# Patient Record
Sex: MALE | Race: WHITE | Employment: OTHER | URBAN - METROPOLITAN AREA
[De-identification: names, ages, dates, MRNs, and addresses within clinical notes are randomized per-mention and may not be internally consistent; named-entity substitution may affect disease eponyms.]

---

## 2017-04-11 RX ORDER — PRAVASTATIN SODIUM 40 MG
TABLET ORAL
Qty: 90 TABLET | Refills: 1 | Status: SHIPPED | OUTPATIENT
Start: 2017-04-11 | End: 2017-10-08 | Stop reason: SDUPTHER

## 2017-05-09 ENCOUNTER — TELEPHONE (OUTPATIENT)
Dept: INTERNAL MEDICINE CLINIC | Age: 72
End: 2017-05-09

## 2017-05-09 DIAGNOSIS — E11.9 TYPE 2 DIABETES MELLITUS WITHOUT COMPLICATION, WITHOUT LONG-TERM CURRENT USE OF INSULIN (HCC): Primary | ICD-10-CM

## 2017-05-09 LAB
A/G RATIO: 1.8 (ref 1.1–2.2)
ALBUMIN SERPL-MCNC: 4.2 G/DL (ref 3.4–5)
ALP BLD-CCNC: 54 U/L (ref 40–129)
ALT SERPL-CCNC: 18 U/L (ref 10–40)
ANION GAP SERPL CALCULATED.3IONS-SCNC: 11 MMOL/L (ref 3–16)
AST SERPL-CCNC: 18 U/L (ref 15–37)
BILIRUB SERPL-MCNC: 0.5 MG/DL (ref 0–1)
BUN BLDV-MCNC: 10 MG/DL (ref 7–20)
CALCIUM SERPL-MCNC: 9.4 MG/DL (ref 8.3–10.6)
CHLORIDE BLD-SCNC: 99 MMOL/L (ref 99–110)
CHOLESTEROL, TOTAL: 115 MG/DL (ref 0–199)
CO2: 31 MMOL/L (ref 21–32)
CREAT SERPL-MCNC: 0.6 MG/DL (ref 0.8–1.3)
GFR AFRICAN AMERICAN: >60
GFR NON-AFRICAN AMERICAN: >60
GLOBULIN: 2.4 G/DL
GLUCOSE BLD-MCNC: 176 MG/DL (ref 70–99)
HDLC SERPL-MCNC: 62 MG/DL (ref 40–60)
LDL CHOLESTEROL CALCULATED: 39 MG/DL
POTASSIUM SERPL-SCNC: 4.8 MMOL/L (ref 3.5–5.1)
SODIUM BLD-SCNC: 141 MMOL/L (ref 136–145)
TOTAL PROTEIN: 6.6 G/DL (ref 6.4–8.2)
TRIGL SERPL-MCNC: 71 MG/DL (ref 0–150)
VLDLC SERPL CALC-MCNC: 14 MG/DL

## 2017-05-10 LAB
ESTIMATED AVERAGE GLUCOSE: 148.5 MG/DL
HBA1C MFR BLD: 6.8 %

## 2017-05-12 ENCOUNTER — OFFICE VISIT (OUTPATIENT)
Dept: INTERNAL MEDICINE CLINIC | Age: 72
End: 2017-05-12

## 2017-05-12 VITALS
BODY MASS INDEX: 32.15 KG/M2 | WEIGHT: 237.4 LBS | RESPIRATION RATE: 16 BRPM | HEART RATE: 80 BPM | DIASTOLIC BLOOD PRESSURE: 80 MMHG | SYSTOLIC BLOOD PRESSURE: 136 MMHG | HEIGHT: 72 IN

## 2017-05-12 DIAGNOSIS — E11.9 TYPE 2 DIABETES MELLITUS WITHOUT COMPLICATION, WITHOUT LONG-TERM CURRENT USE OF INSULIN (HCC): Primary | ICD-10-CM

## 2017-05-12 DIAGNOSIS — E78.00 PURE HYPERCHOLESTEROLEMIA: ICD-10-CM

## 2017-05-12 DIAGNOSIS — I10 ESSENTIAL HYPERTENSION: ICD-10-CM

## 2017-05-12 PROCEDURE — 99214 OFFICE O/P EST MOD 30 MIN: CPT | Performed by: INTERNAL MEDICINE

## 2017-05-12 PROCEDURE — 3288F FALL RISK ASSESSMENT DOCD: CPT | Performed by: INTERNAL MEDICINE

## 2017-07-03 RX ORDER — OMEPRAZOLE 40 MG/1
CAPSULE, DELAYED RELEASE ORAL
Qty: 90 CAPSULE | Refills: 2 | Status: SHIPPED | OUTPATIENT
Start: 2017-07-03 | End: 2018-03-28 | Stop reason: SDUPTHER

## 2017-08-15 ENCOUNTER — HOSPITAL ENCOUNTER (OUTPATIENT)
Dept: ENDOSCOPY | Age: 72
Discharge: HOME | End: 2017-08-15
Attending: INTERNAL MEDICINE | Admitting: INTERNAL MEDICINE

## 2017-08-15 VITALS
HEART RATE: 68 BPM | BODY MASS INDEX: 30.7 KG/M2 | OXYGEN SATURATION: 98 % | RESPIRATION RATE: 16 BRPM | SYSTOLIC BLOOD PRESSURE: 140 MMHG | DIASTOLIC BLOOD PRESSURE: 72 MMHG | TEMPERATURE: 97.4 F | WEIGHT: 226.38 LBS

## 2017-08-15 LAB
GLUCOSE BLD-MCNC: 175 MG/DL (ref 70–99)
PERFORMED ON: ABNORMAL

## 2017-08-15 RX ORDER — SODIUM CHLORIDE 9 MG/ML
INJECTION, SOLUTION INTRAVENOUS CONTINUOUS
Status: DISCONTINUED | OUTPATIENT
Start: 2017-08-15 | End: 2017-08-16 | Stop reason: HOSPADM

## 2017-08-15 RX ADMIN — SODIUM CHLORIDE: 9 INJECTION, SOLUTION INTRAVENOUS at 08:12

## 2017-08-15 ASSESSMENT — PAIN SCALES - GENERAL
PAINLEVEL_OUTOF10: 0
PAINLEVEL_OUTOF10: 0

## 2017-08-15 ASSESSMENT — PAIN - FUNCTIONAL ASSESSMENT: PAIN_FUNCTIONAL_ASSESSMENT: 0-10

## 2017-09-08 RX ORDER — HYDROCHLOROTHIAZIDE 25 MG/1
25 TABLET ORAL DAILY
Qty: 30 TABLET | Refills: 5 | Status: SHIPPED | OUTPATIENT
Start: 2017-09-08 | End: 2017-09-13 | Stop reason: SDUPTHER

## 2017-09-13 RX ORDER — HYDROCHLOROTHIAZIDE 25 MG/1
25 TABLET ORAL DAILY
Qty: 90 TABLET | Refills: 3 | Status: SHIPPED | OUTPATIENT
Start: 2017-09-13 | End: 2018-08-29 | Stop reason: SDUPTHER

## 2017-09-15 DIAGNOSIS — M54.17 RADICULOPATHY OF LUMBOSACRAL REGION: Primary | ICD-10-CM

## 2017-09-19 RX ORDER — LISINOPRIL 10 MG/1
TABLET ORAL
Qty: 90 TABLET | Refills: 3 | Status: SHIPPED | OUTPATIENT
Start: 2017-09-19 | End: 2018-09-14 | Stop reason: SDUPTHER

## 2017-10-09 ENCOUNTER — TELEPHONE (OUTPATIENT)
Dept: INTERNAL MEDICINE CLINIC | Age: 72
End: 2017-10-09

## 2017-10-09 DIAGNOSIS — E11.9 TYPE 2 DIABETES MELLITUS WITHOUT COMPLICATION, WITHOUT LONG-TERM CURRENT USE OF INSULIN (HCC): Primary | ICD-10-CM

## 2017-10-09 LAB
A/G RATIO: 1.5 (ref 1.1–2.2)
ALBUMIN SERPL-MCNC: 4.2 G/DL (ref 3.4–5)
ALP BLD-CCNC: 61 U/L (ref 40–129)
ALT SERPL-CCNC: 19 U/L (ref 10–40)
ANION GAP SERPL CALCULATED.3IONS-SCNC: 15 MMOL/L (ref 3–16)
AST SERPL-CCNC: 22 U/L (ref 15–37)
BILIRUB SERPL-MCNC: 0.4 MG/DL (ref 0–1)
BUN BLDV-MCNC: 11 MG/DL (ref 7–20)
CALCIUM SERPL-MCNC: 9.2 MG/DL (ref 8.3–10.6)
CHLORIDE BLD-SCNC: 98 MMOL/L (ref 99–110)
CHOLESTEROL, TOTAL: 128 MG/DL (ref 0–199)
CO2: 28 MMOL/L (ref 21–32)
CREAT SERPL-MCNC: 0.6 MG/DL (ref 0.8–1.3)
GFR AFRICAN AMERICAN: >60
GFR NON-AFRICAN AMERICAN: >60
GLOBULIN: 2.8 G/DL
GLUCOSE BLD-MCNC: 136 MG/DL (ref 70–99)
HDLC SERPL-MCNC: 55 MG/DL (ref 40–60)
LDL CHOLESTEROL CALCULATED: 56 MG/DL
POTASSIUM SERPL-SCNC: 4.2 MMOL/L (ref 3.5–5.1)
SODIUM BLD-SCNC: 141 MMOL/L (ref 136–145)
TOTAL PROTEIN: 7 G/DL (ref 6.4–8.2)
TRIGL SERPL-MCNC: 85 MG/DL (ref 0–150)
VLDLC SERPL CALC-MCNC: 17 MG/DL

## 2017-10-09 NOTE — TELEPHONE ENCOUNTER
Pt wants to come in this morning to have all of his labs drawn but he needs lab orders placed in his chart, can you pls call the pt at [phone] when the orders are in his chart. Pt has an appt on Oct 16. Thank you!

## 2017-10-10 LAB
ESTIMATED AVERAGE GLUCOSE: 145.6 MG/DL
HBA1C MFR BLD: 6.7 %

## 2017-10-16 ENCOUNTER — OFFICE VISIT (OUTPATIENT)
Dept: INTERNAL MEDICINE CLINIC | Age: 72
End: 2017-10-16

## 2017-10-16 VITALS
HEIGHT: 72 IN | DIASTOLIC BLOOD PRESSURE: 78 MMHG | WEIGHT: 235.8 LBS | BODY MASS INDEX: 31.94 KG/M2 | SYSTOLIC BLOOD PRESSURE: 138 MMHG | RESPIRATION RATE: 16 BRPM | HEART RATE: 76 BPM

## 2017-10-16 DIAGNOSIS — E11.9 TYPE 2 DIABETES MELLITUS WITHOUT COMPLICATION, WITHOUT LONG-TERM CURRENT USE OF INSULIN (HCC): Primary | ICD-10-CM

## 2017-10-16 DIAGNOSIS — E78.00 PURE HYPERCHOLESTEROLEMIA: ICD-10-CM

## 2017-10-16 DIAGNOSIS — I10 ESSENTIAL HYPERTENSION: ICD-10-CM

## 2017-10-16 DIAGNOSIS — Z23 NEED FOR INFLUENZA VACCINATION: ICD-10-CM

## 2017-10-16 DIAGNOSIS — R10.9 LEFT SIDED ABDOMINAL PAIN: ICD-10-CM

## 2017-10-16 PROCEDURE — 99214 OFFICE O/P EST MOD 30 MIN: CPT | Performed by: INTERNAL MEDICINE

## 2017-10-16 PROCEDURE — G0008 ADMIN INFLUENZA VIRUS VAC: HCPCS | Performed by: INTERNAL MEDICINE

## 2017-10-16 PROCEDURE — 90662 IIV NO PRSV INCREASED AG IM: CPT | Performed by: INTERNAL MEDICINE

## 2017-10-16 ASSESSMENT — PATIENT HEALTH QUESTIONNAIRE - PHQ9
SUM OF ALL RESPONSES TO PHQ9 QUESTIONS 1 & 2: 0
1. LITTLE INTEREST OR PLEASURE IN DOING THINGS: 0
SUM OF ALL RESPONSES TO PHQ QUESTIONS 1-9: 0
2. FEELING DOWN, DEPRESSED OR HOPELESS: 0

## 2017-10-16 ASSESSMENT — ENCOUNTER SYMPTOMS
RESPIRATORY NEGATIVE: 1
COUGH: 0
WHEEZING: 0
GASTROINTESTINAL NEGATIVE: 1
SHORTNESS OF BREATH: 0

## 2017-10-16 NOTE — PROGRESS NOTES
Subjective:      Patient ID: Michael Valdez is a 74 y.o. male. HPI   Here for f/u of his DM and htn. He has generally been doing ok, his wife passed away 6 months ago. He states has has left left sided abd bloating and pressure. Not certain what brings this on, but notices it mostly at night. Recent colonoscopy negative. No n/v. The episodes last approx 30 minutes. 1. Type 2 diabetes mellitus without complication, without long-term current use of insulin (HCC)  - No chest pain or shortness of breath. Denies polyuria, polydipsia, or polyphagia. Does not check blood sugar at home. His recent labs are reviewed with him. 2. Pure hypercholesterolemia  -Tolerating medications well. Denies myalgias or muscle weakness. 3. Essential hypertension  -Denies chest pain or shortness of breath. Denies PND or orthopnea. No lower extremity edema noted. Review of Systems   Respiratory: Negative. Negative for cough, shortness of breath and wheezing. Cardiovascular: Negative. Negative for chest pain and palpitations. Gastrointestinal: Negative. All other systems reviewed and are negative. Current Outpatient Prescriptions   Medication Sig Dispense Refill    pravastatin (PRAVACHOL) 40 MG tablet TAKE 1 TABLET DAILY 90 tablet 3    lisinopril (PRINIVIL;ZESTRIL) 10 MG tablet TAKE 1 TABLET DAILY 90 tablet 3    hydrochlorothiazide (HYDRODIURIL) 25 MG tablet Take 1 tablet by mouth daily 90 tablet 3    verapamil (CALAN SR) 180 MG extended release tablet TAKE 1 TABLET DAILY 90 tablet 3    omeprazole (PRILOSEC) 40 MG delayed release capsule TAKE 1 CAPSULE DAILY 90 capsule 2    tamsulosin (FLOMAX) 0.4 MG capsule TAKE 1 CAPSULE EVERY EVENING 90 capsule 1    vitamin D (CHOLECALCIFEROL) 1000 UNIT TABS tablet Take 1,000 Units by mouth daily      aspirin 81 MG tablet Take 81 mg by mouth daily      CALCIUM CITRATE PO Take 2 capsules by mouth 2 times daily.       Cyanocobalamin (VITAMIN B-12 CR PO)

## 2017-10-16 NOTE — PROGRESS NOTES
Vaccine Information Sheet, \"Influenza - Inactivated\"  given to Ngozi Crystal, or parent/legal guardian of  Ngozi Crystal and verbalized understanding. Patient responses:    Have you ever had a reaction to a flu vaccine? No  Are you able to eat eggs without adverse effects? Yes  Do you have any current illness? No  Have you ever had Guillian Foster Syndrome? No    Flu vaccine given per order. Please see immunization tab.

## 2017-12-11 RX ORDER — TAMSULOSIN HYDROCHLORIDE 0.4 MG/1
CAPSULE ORAL
Qty: 90 CAPSULE | Refills: 1 | Status: SHIPPED | OUTPATIENT
Start: 2017-12-11 | End: 2018-06-09 | Stop reason: SDUPTHER

## 2017-12-14 ENCOUNTER — TELEPHONE (OUTPATIENT)
Dept: INTERNAL MEDICINE CLINIC | Age: 72
End: 2017-12-14

## 2017-12-14 NOTE — TELEPHONE ENCOUNTER
Pt is asking if Dr Dylon Lawrence received a blood test he had a good [facility]? He had these on 12/08 and he was told it was sent to Dr Dylon Lawrence.      # [phone]

## 2018-01-11 ENCOUNTER — OFFICE VISIT (OUTPATIENT)
Dept: INTERNAL MEDICINE CLINIC | Age: 73
End: 2018-01-11

## 2018-01-11 VITALS
BODY MASS INDEX: 31.64 KG/M2 | SYSTOLIC BLOOD PRESSURE: 160 MMHG | RESPIRATION RATE: 16 BRPM | HEIGHT: 72 IN | DIASTOLIC BLOOD PRESSURE: 80 MMHG | HEART RATE: 70 BPM | WEIGHT: 233.6 LBS

## 2018-01-11 DIAGNOSIS — M25.551 RIGHT HIP PAIN: Primary | ICD-10-CM

## 2018-01-11 DIAGNOSIS — E11.9 TYPE 2 DIABETES MELLITUS WITHOUT COMPLICATION, WITHOUT LONG-TERM CURRENT USE OF INSULIN (HCC): ICD-10-CM

## 2018-01-11 LAB
CREATININE URINE: 49.3 MG/DL (ref 39–259)
MICROALBUMIN UR-MCNC: 4.2 MG/DL
MICROALBUMIN/CREAT UR-RTO: 85.2 MG/G (ref 0–30)

## 2018-01-11 PROCEDURE — 99213 OFFICE O/P EST LOW 20 MIN: CPT | Performed by: INTERNAL MEDICINE

## 2018-01-11 PROCEDURE — 93000 ELECTROCARDIOGRAM COMPLETE: CPT | Performed by: INTERNAL MEDICINE

## 2018-01-15 ENCOUNTER — OFFICE VISIT (OUTPATIENT)
Dept: ORTHOPEDIC SURGERY | Age: 73
End: 2018-01-15

## 2018-01-15 ENCOUNTER — IMMUNIZATION (OUTPATIENT)
Dept: INTERNAL MEDICINE CLINIC | Age: 73
End: 2018-01-15

## 2018-01-15 VITALS
HEIGHT: 72 IN | WEIGHT: 233 LBS | SYSTOLIC BLOOD PRESSURE: 162 MMHG | BODY MASS INDEX: 31.56 KG/M2 | HEART RATE: 78 BPM | DIASTOLIC BLOOD PRESSURE: 98 MMHG | TEMPERATURE: 98.2 F

## 2018-01-15 DIAGNOSIS — Z23 NEED FOR TETANUS BOOSTER: Primary | ICD-10-CM

## 2018-01-15 DIAGNOSIS — M25.551 RIGHT HIP PAIN: ICD-10-CM

## 2018-01-15 DIAGNOSIS — Z96.641 H/O TOTAL HIP ARTHROPLASTY, RIGHT: Primary | ICD-10-CM

## 2018-01-15 PROCEDURE — 99204 OFFICE O/P NEW MOD 45 MIN: CPT | Performed by: ORTHOPAEDIC SURGERY

## 2018-01-15 PROCEDURE — 90471 IMMUNIZATION ADMIN: CPT | Performed by: INTERNAL MEDICINE

## 2018-01-15 PROCEDURE — 90715 TDAP VACCINE 7 YRS/> IM: CPT | Performed by: INTERNAL MEDICINE

## 2018-01-15 NOTE — PROGRESS NOTES
This dictation was done with [employer] dictation and may contain mechanical errors related to translation. Blood pressure (!) 162/98, pulse 78, temperature 98.2 °F (36.8 °C), temperature source Temporal, height 6' (1.829 m), weight 233 lb (105.7 kg).

## 2018-01-15 NOTE — PROGRESS NOTES
The patient is 73-year-old male who presents today for further evaluation and second opinion concerning his right total hip arthroplasty. The patient has a history of uncemented right total hip arthroplasty performed 10 years ago in December 2007. This was performed by another surgeon through posterior approach. The patient was doing well and had no postoperative problems however within the last few months he started complaining about pain in the right buttock region and also the right lateral hip. He's had no further history of injury or surgery to the right hip. The patient does not smoke is not taking any oral narcotics and does not have a history of DVT or pulmonary embolism. Patient states he has no allergies to metals but does have a very positive family history for degenerative arthritis. Patient also states he is not diabetic but had a recent hemoglobin A1c of 6.7. He complains of pain with ambulation and difficulty with walking without a limp. Patient also states that he's had a history of back pain however has not had any surgery on his lumbar spine. The patient has a history of radiculopathy from the lumbosacral region. The patient was seen by another orthopedic surgeon who recommended revision hip arthroplasty. The patient underwent a MARS MRI of his hip joint replacement which showed small fluid accumulations chronic tearing of the gluteus medius and half of the gluteus minimus along with possible pseudotumors which can be associated with metal-on-metal prostheses. Patient also underwent a chromium urine specimen which was increased compared to normal.  Patient also stated that he had a blood chromium study which was negative for increased chromium ions. He complains of pain rating it up to 2 out of 10 with aching at night and also with walking.     Patient Active Problem List   Diagnosis    Hyperlipidemia    Impotence    Benign prostatic hyperplasia    Hypertension    DM (diabetes mellitus), type 2 ([facility] [address]    Radiculopathy of lumbosacral region    [facility]     Prior to Visit Medications    Medication Sig Taking? Authorizing Provider   tamsulosin (FLOMAX) 0.4 MG capsule TAKE 1 CAPSULE EVERY EVENING Yes Ash Jackson MD   pravastatin (PRAVACHOL) 40 MG tablet TAKE 1 TABLET DAILY Yes Ash Jackson MD   lisinopril (PRINIVIL;ZESTRIL) 10 MG tablet TAKE 1 TABLET DAILY Yes Ash Jackson MD   hydrochlorothiazide (HYDRODIURIL) 25 MG tablet Take 1 tablet by mouth daily Yes Ash Jackson MD   verapamil (CALAN SR) 180 MG extended release tablet TAKE 1 TABLET DAILY Yes Ash Jackson MD   omeprazole (PRILOSEC) 40 MG delayed release capsule TAKE 1 CAPSULE DAILY Yes Ash Jackson MD   vitamin D (CHOLECALCIFEROL) 1000 UNIT TABS tablet Take 1,000 Units by mouth daily Yes Historical Provider, MD   aspirin 81 MG tablet Take 81 mg by mouth daily Yes Historical Provider, MD   CALCIUM CITRATE PO Take 2 capsules by mouth 2 times daily. Yes Historical Provider, MD   Cyanocobalamin (VITAMIN B-12 CR PO) Take  by mouth See Admin Instructions. Takes 3x/week Yes Historical Provider, MD   Multiple Vitamin (MULTI-VITAMIN PO) Take 1 tablet by mouth 2 times daily. Yes Historical Provider, MD     Physical examination 20-year-old male oriented ×3 temperature is 98.2. Examination of his back shows mild decreased range of motion but no specific pain tenderness or instability. Motor exam shows quadriceps hamstrings hip abductors and abductors for plantar dorsiflexors are intact 4-5 over 5 to the right lower extremity. Sensation and perfusion is intact to the right foot and ankle. There is no numbness or tingling noted in the right lower extremity. Deep tendon reflexes 0 to knee 0 to ankle of the right lower extremity. No other obvious cutaneous lesions lymphedema or cellulitic processes are noted in the right lower extremity.   Examination of the right hip shows a well-healed posterior scar good range of motion no signs of instability or deep sepsis noted. Examination of the contralateral hip shows excellent range of motion with no signs of instability or deep sepsis. X-rays obtained AP pelvis and AP lateral of the right hip show status post uncemented metal-on-metal total hip arthroplasty. The overall orientation and alignment appear to be stable with well ingrown acetabular and femoral components. Patient has mild to moderate degenerative osteoarthritis of the contralateral left hip. No signs of subsidence or loosening noted. No other obvious fractures tumors or dislocations are noted on these x-rays. Impression a 69-year-old male with somewhat painful right total hip arthroplasty 10 years postop metal-on-metal total hip. At this point in time his symptoms appear to be more related to the lumbosacral spine. She does have an MRI which shows tearing of the gluteus medius and minimus tendon which could be part of his lateral hip pain. Somewhat confusing story of his elevated chromium levels with positive urine and negative blood study. 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 Indications for surgical procedure would include removal of the metal-on-metal articulating device and replacement with plastic on a hard femoral head component. Plan we had a 45 minute face-to-face discussion with this patient of which greater than 50% of time was spent in counseling him concerning his painful right total hip arthroplasty. At this point time I do not believe that revision surgery will necessarily remove all the pain he has from his hip. However due to his elevated chromium levels and the fact that he has a metal-on-metal device revision hip surgery should be considered.   Before surgery however I would probably suggest a therapy protocol for his lumbosacral spine and may be even

## 2018-01-17 ENCOUNTER — TELEPHONE (OUTPATIENT)
Dept: ORTHOPEDIC SURGERY | Age: 73
End: 2018-01-17

## 2018-01-17 DIAGNOSIS — Z96.641 HISTORY OF TOTAL RIGHT HIP REPLACEMENT: Primary | ICD-10-CM

## 2018-01-17 RX ORDER — METHYLPREDNISOLONE 4 MG/1
TABLET ORAL
Qty: 1 KIT | Refills: 0 | Status: CANCELLED | OUTPATIENT
Start: 2018-01-17

## 2018-01-17 NOTE — LETTER
1945                                                                           SURGERY DATE: ___/___/___                                                                      SURGERY TIME:  ___:___ AM/PM                                                                      ARRIVAL TIME:   ___:___  AM/PM                                                                                                                       **REPORT TO THE INFORMATION                                                  [facility] LOBBY [facility] [address] [facility]  Sur[facility] Scheduler    [facility] [place] ([facility]) Physicians  [facility]  [address], [facility] [address]  [place]    [phone]  Phone

## 2018-01-17 NOTE — TELEPHONE ENCOUNTER
Pt calling to schedule surg, says he needs a hip revision, cobalt and chromium labs prior, PT pre op and possible steroids.    [phone], I dont have a letter yet

## 2018-01-18 DIAGNOSIS — Z96.641 HISTORY OF TOTAL RIGHT HIP REPLACEMENT: ICD-10-CM

## 2018-01-19 NOTE — TELEPHONE ENCOUNTER
Please call him regarding the steroids, he is going to go with the 5 days, but has questions and wants to know about PT before.    Told it may be Monday before he hears back  [phone]

## 2018-01-21 LAB
CHROMIUM, SERUM: 1.8 UG/L
MISCELLANEOUS LAB TEST ORDER: NORMAL

## 2018-01-23 RX ORDER — METHYLPREDNISOLONE 4 MG/1
TABLET ORAL
Qty: 1 KIT | Refills: 0 | Status: SHIPPED | OUTPATIENT
Start: 2018-01-23 | End: 2018-02-13

## 2018-01-23 NOTE — TELEPHONE ENCOUNTER
Pt is calling back about his medrol dose [facility]  [facility]  Patient also is wondering where his packet of paper work for the surgery is. Please advise.

## 2018-02-13 ENCOUNTER — OFFICE VISIT (OUTPATIENT)
Dept: INTERNAL MEDICINE CLINIC | Age: 73
End: 2018-02-13

## 2018-02-13 VITALS
HEART RATE: 74 BPM | DIASTOLIC BLOOD PRESSURE: 76 MMHG | HEIGHT: 72 IN | SYSTOLIC BLOOD PRESSURE: 130 MMHG | BODY MASS INDEX: 32.4 KG/M2 | WEIGHT: 239.2 LBS | RESPIRATION RATE: 16 BRPM

## 2018-02-13 DIAGNOSIS — E78.00 PURE HYPERCHOLESTEROLEMIA: ICD-10-CM

## 2018-02-13 DIAGNOSIS — E11.9 TYPE 2 DIABETES MELLITUS WITHOUT COMPLICATION, WITHOUT LONG-TERM CURRENT USE OF INSULIN (HCC): ICD-10-CM

## 2018-02-13 DIAGNOSIS — I10 ESSENTIAL HYPERTENSION: ICD-10-CM

## 2018-02-13 DIAGNOSIS — Z01.818 PREOP EXAMINATION: ICD-10-CM

## 2018-02-13 DIAGNOSIS — M25.551 RIGHT HIP PAIN: Primary | ICD-10-CM

## 2018-02-13 PROCEDURE — 99214 OFFICE O/P EST MOD 30 MIN: CPT | Performed by: INTERNAL MEDICINE

## 2018-02-13 ASSESSMENT — ENCOUNTER SYMPTOMS
SHORTNESS OF BREATH: 0
GASTROINTESTINAL NEGATIVE: 1
WHEEZING: 0
RESPIRATORY NEGATIVE: 1
BLOOD IN STOOL: 0

## 2018-02-14 ENCOUNTER — HOSPITAL ENCOUNTER (OUTPATIENT)
Dept: PREADMISSION TESTING | Age: 73
Discharge: HOME | End: 2018-02-14
Attending: ORTHOPAEDIC SURGERY | Admitting: ORTHOPAEDIC SURGERY

## 2018-02-14 ENCOUNTER — TELEPHONE (OUTPATIENT)
Dept: ORTHOPEDIC SURGERY | Age: 73
End: 2018-02-14

## 2018-02-14 DIAGNOSIS — Z01.818 ENCOUNTER FOR PREADMISSION TESTING: ICD-10-CM

## 2018-02-14 LAB
ABO/RH: NORMAL
ALBUMIN SERPL-MCNC: 4.1 G/DL (ref 3.4–5)
ANION GAP SERPL CALCULATED.3IONS-SCNC: 11 MMOL/L (ref 3–16)
ANTIBODY SCREEN: NORMAL
APTT: 28 SEC (ref 24.1–34.9)
BASOPHILS ABSOLUTE: 0.1 K/UL (ref 0–0.2)
BASOPHILS RELATIVE PERCENT: 1 %
BILIRUBIN URINE: NEGATIVE
BLOOD, URINE: NEGATIVE
BUN BLDV-MCNC: 10 MG/DL (ref 7–20)
CALCIUM SERPL-MCNC: 9.5 MG/DL (ref 8.3–10.6)
CHLORIDE BLD-SCNC: 98 MMOL/L (ref 99–110)
CLARITY: CLEAR
CO2: 31 MMOL/L (ref 21–32)
COLOR: YELLOW
CREAT SERPL-MCNC: 0.5 MG/DL (ref 0.8–1.3)
EOSINOPHILS ABSOLUTE: 0.1 K/UL (ref 0–0.6)
EOSINOPHILS RELATIVE PERCENT: 2.2 %
ESTIMATED AVERAGE GLUCOSE: 145.6 MG/DL
GFR AFRICAN AMERICAN: >60
GFR NON-AFRICAN AMERICAN: >60
GLUCOSE BLD-MCNC: 128 MG/DL (ref 70–99)
GLUCOSE URINE: NEGATIVE MG/DL
HBA1C MFR BLD: 6.7 %
HCT VFR BLD CALC: 44.8 % (ref 40.5–52.5)
HEMOGLOBIN: 15.3 G/DL (ref 13.5–17.5)
INR BLD: 0.98 (ref 0.85–1.15)
KETONES, URINE: NEGATIVE MG/DL
LEUKOCYTE ESTERASE, URINE: NEGATIVE
LYMPHOCYTES ABSOLUTE: 1.2 K/UL (ref 1–5.1)
LYMPHOCYTES RELATIVE PERCENT: 21.7 %
MCH RBC QN AUTO: 31 PG (ref 26–34)
MCHC RBC AUTO-ENTMCNC: 34.1 G/DL (ref 31–36)
MCV RBC AUTO: 91.1 FL (ref 80–100)
MICROSCOPIC EXAMINATION: NORMAL
MONOCYTES ABSOLUTE: 0.5 K/UL (ref 0–1.3)
MONOCYTES RELATIVE PERCENT: 8.9 %
NEUTROPHILS ABSOLUTE: 3.7 K/UL (ref 1.7–7.7)
NEUTROPHILS RELATIVE PERCENT: 66.2 %
NITRITE, URINE: NEGATIVE
PDW BLD-RTO: 13.8 % (ref 12.4–15.4)
PH UA: 7
PLATELET # BLD: 199 K/UL (ref 135–450)
PMV BLD AUTO: 8.9 FL (ref 5–10.5)
POTASSIUM SERPL-SCNC: 4 MMOL/L (ref 3.5–5.1)
PREALBUMIN: 26.6 MG/DL (ref 20–40)
PROTEIN UA: NEGATIVE MG/DL
PROTHROMBIN TIME: 11.1 SEC (ref 9.6–13)
RBC # BLD: 4.91 M/UL (ref 4.2–5.9)
SEDIMENTATION RATE, ERYTHROCYTE: 10 MM/HR (ref 0–20)
SODIUM BLD-SCNC: 140 MMOL/L (ref 136–145)
SPECIFIC GRAVITY UA: 1.01
URINE REFLEX TO CULTURE: NORMAL
URINE TYPE: NORMAL
UROBILINOGEN, URINE: 0.2 E.U./DL
VITAMIN D 25-HYDROXY: 34.2 NG/ML
WBC # BLD: 5.6 K/UL (ref 4–11)

## 2018-02-15 ENCOUNTER — OFFICE VISIT (OUTPATIENT)
Dept: ORTHOPEDIC SURGERY | Age: 73
End: 2018-02-15

## 2018-02-15 VITALS
BODY MASS INDEX: 32.37 KG/M2 | WEIGHT: 239 LBS | SYSTOLIC BLOOD PRESSURE: 142 MMHG | TEMPERATURE: 97.5 F | HEIGHT: 72 IN | DIASTOLIC BLOOD PRESSURE: 79 MMHG | HEART RATE: 76 BPM

## 2018-02-15 DIAGNOSIS — Z96.641 HISTORY OF TOTAL RIGHT HIP REPLACEMENT: Primary | ICD-10-CM

## 2018-02-15 LAB — MRSA SCREEN RT-PCR: NORMAL

## 2018-02-15 PROCEDURE — 99214 OFFICE O/P EST MOD 30 MIN: CPT | Performed by: ORTHOPAEDIC SURGERY

## 2018-02-15 NOTE — PROGRESS NOTES
This dictation was done with [employer] dictation and may contain mechanical errors related to translation. Blood pressure (!) 142/79, pulse 76, temperature 97.5 °F (36.4 °C), height 6' (1.829 m), weight 239 lb (108.4 kg).

## 2018-02-15 NOTE — ADDENDUM NOTE
Encounter addended by: Jaleel Murcia MA on: 2/15/2018 12:07 PM<BR>    Actions taken: Letter status changed

## 2018-02-16 ENCOUNTER — PAT TELEPHONE (OUTPATIENT)
Dept: PREADMISSION TESTING | Age: 73
End: 2018-02-16

## 2018-02-16 VITALS — BODY MASS INDEX: 32.37 KG/M2 | HEIGHT: 72 IN | WEIGHT: 239 LBS

## 2018-02-16 RX ORDER — OXYCODONE HYDROCHLORIDE 5 MG/1
10 TABLET ORAL ONCE
Status: CANCELLED | OUTPATIENT
Start: 2018-02-23

## 2018-02-16 RX ORDER — CELECOXIB 200 MG/1
200 CAPSULE ORAL ONCE
Status: CANCELLED | OUTPATIENT
Start: 2018-02-23

## 2018-02-16 NOTE — PRE-PROCEDURE INSTRUCTIONS
PRE-OP INSTRUCTIONS     · Do not eat or drink anything after 12:00 midnight prior to surgery. · This includes water, chewing gum, mints and ice chips. · You may brush your teeth and gargle the morning of surgery but DO  NOT SWALLOW THE WATER. Take the following medications with a small sip of water on the morning of procedure. ..see epic    · You may be asked to stop blood thinners such as:  Coumadin, Plavix, Fragmin and lovenox. Please check with your doctor before stopping these or any other medications. · Aspirin, ibuprofen, advil and naproxen, any anti-inflammatory products should be stopped for a week prior to your surgery. · Do not smoke and do not drink any alcoholic beverages 24 hours prior to your surgery. · Please do not wear any jewelry or body piercings on the day of surgery. · Please wear something simple, loose fitting clothing to the hospital.  Do not wear any make-up(including eye make-up) or nail polish on your fingers and toes. As part of our patient safety program to minimize surgical infections, we ask you to do the following:    Please notify your surgeon if you develop any illness between now and the day of your surgery. This includes a cough, cold, fever, sore  throat, nausea, vomiting, diarrhea, etc.   Please notify your surgeon if you experience dizziness, shortness of  breath or blurred vision between now and the time of your surgery. Please notify your surgeon of any open or redden areas that may look infected       DO NOT shave your operative site 96 hours(four days) prior to surgery. Shower the week before surgery with an antibacterial soap such as:dial,safeguard, etc.       Three(3) days prior to your surgery, cleanse the operative site with Hibiclens(anti-microbial soap).  This soap may dry your skin, please do not apply any oils or lotions     · Please bring your insurance card and picture ID day of surgery    · If you have a living will or durable power of attorny. Please bring in a copy of you advanced directives. · If you have dentures, they will be removed before going to the OR, we will provide you with a container. If you wear contact lenses/glasses, they will be removes, please bring a case    · Have you seen your family doctor for a pre-op history and physical.      · Surgery scheduler will call you 48 hours prior to your surgery to notify you of the time of your surgery and the time you will need to be at hospital...patients are asked to arrive 2 1/2 hours prior to surgery. ·  Please call Pre-Admission testing if you have any further questions.                   [id number] [facility] testing phone number:  [phone]      Thank You for choosing [facility]!!

## 2018-02-18 NOTE — PROGRESS NOTES
(HYDRODIURIL) 25 MG tablet Take 1 tablet by mouth daily Yes Nik Dnulap MD   verapamil (CALAN SR) 180 MG extended release tablet TAKE 1 TABLET DAILY Yes Nik Dunlap MD   omeprazole (PRILOSEC) 40 MG delayed release capsule TAKE 1 CAPSULE DAILY Yes Nik Dunlap MD   vitamin D (CHOLECALCIFEROL) 1000 UNIT TABS tablet Take 1,000 Units by mouth daily Yes Historical Provider, MD   aspirin 81 MG tablet Take 81 mg by mouth daily Yes Historical Provider, MD   CALCIUM CITRATE PO Take 2 capsules by mouth 2 times daily. Yes Historical Provider, MD   Cyanocobalamin (VITAMIN B-12 CR PO) Take by mouth See Admin Instructions Takes 2x/week Yes Historical Provider, MD   Multiple Vitamin (MULTI-VITAMIN PO) Take 1 tablet by mouth 2 times daily. Yes Historical Provider, MD     Physical examination 49-year-old male oriented ×3 temperature is 97.5. Examination of his back shows good range of motion no specific pain tenderness or instability. Motor exam to the right lower extremity shows quadriceps hamstrings hip abductors and abductors for plantar dorsiflexors are intact 4-5 over 5. Sensation and perfusion is intact to the right foot and lower extremity. There is no numbness or tingling noted in the right lower extremity. Deep tendon reflexes are 0 at the knee and 0 at the ankle of the right lower extremity. Examination of the right hip shows a well-healed posterior scar good range of motion of the hip joint with no signs of instability or deep sepsis. No other obvious cutaneous lesions lymphedema or cellulitic processes are noted in the right lower extremity. No new x-rays obtained in today's office visit. Impression 49-year-old male with a metal-on-metal total hip arthroplasty performed 10 years ago. Patient is having some increasing buttock pain however this probably is not directly related to his implant.   Due to the fact that metal-on-metal implants can cause high chromium levels and some local aseptic lesions revision and exchange arthroplasty is recommended. We discussed the management of metal-on-metal hip prostheses both surgical and nonsurgical with the patient at great length. We had a 35 minute face-to-face discussion of which greater than 50% of the time was spent in counseling with this patient concerning revision right total hip arthroplasty it's preoperative evaluation and its postoperative pain management and follow-up. We went over the surgical procedure risks and complications including bleeding, infection,  neurovascular injury, decreased range of motion, persistent pain, instability with dislocation, DVT, pulmonary embolism, leg length inequality, change in mental status, and need for further surgical procedures. The patient understands this and we have answered all of their questions and concerns. We will follow him up in the operating room on 2/23/2018. We also we will perform this procedure through a lateral approach as I believe this will give us better exposure to the ALVAL lesions and also enable us to repair any torn a gluteus medius or minimus tendons.

## 2018-02-22 ENCOUNTER — TELEPHONE (OUTPATIENT)
Dept: ORTHOPEDIC SURGERY | Age: 73
End: 2018-02-22

## 2018-02-22 NOTE — TELEPHONE ENCOUNTER
Spoke to Negin Mejias several times regarding this precert. He was originally scheduled in Jan with another physician, but came to [facility] for a second opinion. He decided to go with [facility]. The other surgeons ofc cancelled their precert. [employer] was not going to approve this case, stating that it was scheduled with another doc, even though I told them it was cancelled. They closed the case. [facility] did a Consult with them because it was not able to be overturned per Negin Mejias. They agreed to send it to appeals. I did an expedited appeal and then they called and said they needed a form signed by me and patient in order to do appeal.  Called several times for a status and finally last night an auth was faxed for surg. Copy of ppwk signed by patient and myself mailed to patient.

## 2018-02-22 NOTE — ADDENDUM NOTE
Encounter addended by: Kimnegar Alvarado MA on: 2/22/2018  9:56 AM<BR>    Actions taken: Letter status changed

## 2018-02-23 PROBLEM — S76.019A TEAR OF GLUTEUS MINIMUS TENDON: Status: ACTIVE | Noted: 2018-02-23

## 2018-02-23 NOTE — ADDENDUM NOTE
Encounter addended by: Emely Nichole MA on: 2/23/2018 10:19 AM<BR>    Actions taken: Letter status changed

## 2018-03-06 ENCOUNTER — TELEPHONE (OUTPATIENT)
Dept: ORTHOPEDIC SURGERY | Age: 73
End: 2018-03-06

## 2018-03-12 ENCOUNTER — OFFICE VISIT (OUTPATIENT)
Dept: ORTHOPEDIC SURGERY | Age: 73
End: 2018-03-12

## 2018-03-12 VITALS — TEMPERATURE: 97.9 F | HEIGHT: 72 IN | BODY MASS INDEX: 31.15 KG/M2 | RESPIRATION RATE: 16 BRPM | WEIGHT: 230 LBS

## 2018-03-12 DIAGNOSIS — S76.011A TEAR OF RIGHT GLUTEUS MINIMUS TENDON, INITIAL ENCOUNTER: Primary | ICD-10-CM

## 2018-03-14 ENCOUNTER — TELEPHONE (OUTPATIENT)
Dept: INTERNAL MEDICINE CLINIC | Age: 73
End: 2018-03-14

## 2018-03-20 ENCOUNTER — TELEPHONE (OUTPATIENT)
Dept: PHARMACY | Facility: CLINIC | Age: 73
End: 2018-03-20

## 2018-03-20 DIAGNOSIS — Z71.89 ENCOUNTER FOR MEDICATION REVIEW AND COUNSELING: Primary | ICD-10-CM

## 2018-03-20 PROCEDURE — 1111F DSCHRG MED/CURRENT MED MERGE: CPT | Performed by: PHARMACIST

## 2018-03-20 NOTE — TELEPHONE ENCOUNTER
 omeprazole (PRILOSEC) 40 MG delayed release capsule TAKE 1 CAPSULE DAILY    vitamin D (CHOLECALCIFEROL) 1000 UNIT TABS tablet Take 1,000 Units by mouth daily    CALCIUM CITRATE PO Take 1,000 mcg by mouth 4 times daily   - h/o gastric bypass, states supplements dosed by that provider    Cyanocobalamin (VITAMIN B-12 CR PO) Take 1,000 mcg by mouth See Admin Instructions Takes 2x/week(Monday and Friday)  - SL 1000 mcg, 2 per week on Mondays and Fridays    Multiple Vitamin (MULTI-VITAMIN PO) Take 1 tablet by mouth 2 times daily. Additional Medications:  Denies    Estimated Creatinine Clearance: 162 mL/min (A) (based on SCr of 0.5 mg/dL (L)). The ASCVD Risk score (Lester Gipson, et al., 2013) failed to calculate for the following reasons: The valid total cholesterol range is 130 to 320 mg/dL     Lab Results   Component Value Date    ALT 19 10/09/2017        Assessment/Plan:  - Medication reconciliation completed. - Pt is not taking medications as directed by discharging physician. Number of discrepancies: 2. Instructions per discharge list provided except per below documentation. Identified medication discrepancies/issues:   · Category 2 (1):  1. Eliquis: pt has only been taking 1 tablet once daily. Reviewed instructions of 2.5 mg BID for clot prevention after hip surgery, recommended duration 35 days. Advised pt to take as prescribed through 3/30/2018. He plans to resume ASA 81 mg daily after completing course. · Category 4 (1):  1. Miralax: not using, not needed (is not on opioids)  2.  Vitamin B12: clarified preparation    - [facility] active medication list updated:  · Medications Added (0):    · Medications Removed (1):  Miralax  · Medications Changed (1):  Vitamin B12    - Identified Potential Medication Interactions: No clinically significant interactions identified via [employer] Interaction Analysis as category D or higher.    - Renal Dosing: No renal adjustments necessary.    - Follow up appointment date (7 days for more severe illness, 14 days for others):  Saw ortho 3/12/2018; pt canceled 3/12/2018 appt with PCP but has upcoming appt 4/6/2018  · Patient encouraged to follow up with PCP and/or specialists as advised at discharge    Thank you,    Nel Stanford, PharmD, [address] Pharmacist  Direct: [phone]  Dept: [phone] (toll free [phone], option 7)     CLINICAL PHARMACY NOTE   POST-DISCHARGE [facility] [address] Only    TCM Call Made?: No  [facility]) Select Patient?: Yes  Total # of Interventions Recommended: 2 - Increased Dose #: 1  - Updated Order #: 1  Total # Interventions Accepted: 2  Intervention Severity:   - Level 1 Intervention Present?: No   - Level 2 #: 1   - Level 3 #: 0  Outreach Status: Review Complete  Care Coordinator Outreach to Patient?: No  Provider Contacted?: Yes - Note Routed, Routine  Time Spent (min): 30

## 2018-03-21 RX ORDER — MAGNESIUM 200 MG
1 TABLET ORAL WEEKLY
Qty: 1 TABLET | Refills: 0 | COMMUNITY
Start: 2018-03-21

## 2018-03-26 ENCOUNTER — TELEPHONE (OUTPATIENT)
Dept: INTERNAL MEDICINE CLINIC | Age: 73
End: 2018-03-26

## 2018-03-26 DIAGNOSIS — Z98.84 S/P GASTRIC BYPASS: ICD-10-CM

## 2018-03-26 DIAGNOSIS — I10 ESSENTIAL HYPERTENSION: ICD-10-CM

## 2018-03-26 DIAGNOSIS — E11.9 TYPE 2 DIABETES MELLITUS WITHOUT COMPLICATION, WITHOUT LONG-TERM CURRENT USE OF INSULIN (HCC): Primary | ICD-10-CM

## 2018-03-28 RX ORDER — OMEPRAZOLE 40 MG/1
CAPSULE, DELAYED RELEASE ORAL
Qty: 90 CAPSULE | Refills: 2 | Status: SHIPPED | OUTPATIENT
Start: 2018-03-28 | End: 2018-12-24 | Stop reason: SDUPTHER

## 2018-04-02 LAB
A/G RATIO: 1.7 (ref 1.1–2.2)
ALBUMIN SERPL-MCNC: 4.4 G/DL (ref 3.4–5)
ALP BLD-CCNC: 80 U/L (ref 40–129)
ALT SERPL-CCNC: 19 U/L (ref 10–40)
ANION GAP SERPL CALCULATED.3IONS-SCNC: 13 MMOL/L (ref 3–16)
AST SERPL-CCNC: 21 U/L (ref 15–37)
BILIRUB SERPL-MCNC: 0.4 MG/DL (ref 0–1)
BUN BLDV-MCNC: 9 MG/DL (ref 7–20)
CALCIUM SERPL-MCNC: 9 MG/DL (ref 8.3–10.6)
CHLORIDE BLD-SCNC: 99 MMOL/L (ref 99–110)
CHOLESTEROL, TOTAL: 137 MG/DL (ref 0–199)
CO2: 28 MMOL/L (ref 21–32)
CREAT SERPL-MCNC: 0.5 MG/DL (ref 0.8–1.3)
GFR AFRICAN AMERICAN: >60
GFR NON-AFRICAN AMERICAN: >60
GLOBULIN: 2.6 G/DL
GLUCOSE BLD-MCNC: 139 MG/DL (ref 70–99)
HDLC SERPL-MCNC: 64 MG/DL (ref 40–60)
LDL CHOLESTEROL CALCULATED: 57 MG/DL
POTASSIUM SERPL-SCNC: 4.3 MMOL/L (ref 3.5–5.1)
SODIUM BLD-SCNC: 140 MMOL/L (ref 136–145)
TOTAL PROTEIN: 7 G/DL (ref 6.4–8.2)
TRIGL SERPL-MCNC: 79 MG/DL (ref 0–150)
TSH SERPL DL<=0.05 MIU/L-ACNC: 1.41 UIU/ML (ref 0.27–4.2)
VITAMIN B-12: 915 PG/ML (ref 211–911)
VITAMIN D 25-HYDROXY: 29.4 NG/ML
VLDLC SERPL CALC-MCNC: 16 MG/DL

## 2018-04-03 DIAGNOSIS — E11.9 TYPE 2 DIABETES MELLITUS WITHOUT COMPLICATION, WITHOUT LONG-TERM CURRENT USE OF INSULIN (HCC): Primary | ICD-10-CM

## 2018-04-06 ENCOUNTER — OFFICE VISIT (OUTPATIENT)
Dept: INTERNAL MEDICINE CLINIC | Age: 73
End: 2018-04-06

## 2018-04-06 VITALS
SYSTOLIC BLOOD PRESSURE: 138 MMHG | WEIGHT: 241 LBS | HEART RATE: 80 BPM | BODY MASS INDEX: 32.64 KG/M2 | DIASTOLIC BLOOD PRESSURE: 82 MMHG | HEIGHT: 72 IN | OXYGEN SATURATION: 95 %

## 2018-04-06 DIAGNOSIS — R10.9 LEFT FLANK PAIN: ICD-10-CM

## 2018-04-06 DIAGNOSIS — I10 ESSENTIAL HYPERTENSION: ICD-10-CM

## 2018-04-06 DIAGNOSIS — E78.00 PURE HYPERCHOLESTEROLEMIA: ICD-10-CM

## 2018-04-06 DIAGNOSIS — E11.9 TYPE 2 DIABETES MELLITUS WITHOUT COMPLICATION, WITHOUT LONG-TERM CURRENT USE OF INSULIN (HCC): Primary | ICD-10-CM

## 2018-04-06 LAB — HBA1C MFR BLD: 7 %

## 2018-04-06 PROCEDURE — 99214 OFFICE O/P EST MOD 30 MIN: CPT | Performed by: INTERNAL MEDICINE

## 2018-04-06 PROCEDURE — 83036 HEMOGLOBIN GLYCOSYLATED A1C: CPT | Performed by: INTERNAL MEDICINE

## 2018-04-06 ASSESSMENT — ENCOUNTER SYMPTOMS
BLOOD IN STOOL: 0
RESPIRATORY NEGATIVE: 1
GASTROINTESTINAL NEGATIVE: 1

## 2018-04-23 ENCOUNTER — OFFICE VISIT (OUTPATIENT)
Dept: ORTHOPEDIC SURGERY | Age: 73
End: 2018-04-23

## 2018-04-23 ENCOUNTER — TELEPHONE (OUTPATIENT)
Dept: ORTHOPEDIC SURGERY | Age: 73
End: 2018-04-23

## 2018-04-23 VITALS — BODY MASS INDEX: 31.15 KG/M2 | TEMPERATURE: 96.9 F | WEIGHT: 230 LBS | HEIGHT: 72 IN

## 2018-04-23 DIAGNOSIS — S76.011A TEAR OF RIGHT GLUTEUS MINIMUS TENDON, INITIAL ENCOUNTER: Primary | ICD-10-CM

## 2018-04-23 DIAGNOSIS — Z96.641 HISTORY OF TOTAL HIP ARTHROPLASTY, RIGHT: ICD-10-CM

## 2018-04-23 PROCEDURE — 99024 POSTOP FOLLOW-UP VISIT: CPT | Performed by: PHYSICIAN ASSISTANT

## 2018-04-25 LAB — Lab: 2.6 UG/L

## 2018-06-10 RX ORDER — TAMSULOSIN HYDROCHLORIDE 0.4 MG/1
CAPSULE ORAL
Qty: 90 CAPSULE | Refills: 1 | Status: SHIPPED | OUTPATIENT
Start: 2018-06-10 | End: 2018-12-07 | Stop reason: SDUPTHER

## 2018-06-28 ENCOUNTER — TELEPHONE (OUTPATIENT)
Dept: INTERNAL MEDICINE CLINIC | Age: 73
End: 2018-06-28

## 2018-08-29 RX ORDER — HYDROCHLOROTHIAZIDE 25 MG/1
TABLET ORAL
Qty: 90 TABLET | Refills: 3 | Status: SHIPPED | OUTPATIENT
Start: 2018-08-29 | End: 2019-04-08 | Stop reason: ALTCHOICE

## 2018-09-14 RX ORDER — LISINOPRIL 10 MG/1
TABLET ORAL
Qty: 90 TABLET | Refills: 3 | Status: SHIPPED | OUTPATIENT
Start: 2018-09-14 | End: 2019-09-09 | Stop reason: SDUPTHER

## 2018-10-04 RX ORDER — PRAVASTATIN SODIUM 40 MG
TABLET ORAL
Qty: 90 TABLET | Refills: 3 | Status: SHIPPED | OUTPATIENT
Start: 2018-10-04 | End: 2019-09-30 | Stop reason: SDUPTHER

## 2019-04-05 ENCOUNTER — TELEPHONE (OUTPATIENT)
Dept: ORTHOPEDIC SURGERY | Age: 74
End: 2019-04-05

## 2019-04-05 NOTE — TELEPHONE ENCOUNTER
Patient called to request an order for labs prior to his appt on 4/18/2019. He would like labs for chromium and cobalt sent to [facility] on [address].     Please call patient when complete:  [phone]

## 2019-04-08 ENCOUNTER — TELEPHONE (OUTPATIENT)
Dept: ORTHOPEDIC SURGERY | Age: 74
End: 2019-04-08

## 2019-04-08 DIAGNOSIS — Z96.641 HISTORY OF TOTAL HIP ARTHROPLASTY, RIGHT: Primary | ICD-10-CM

## 2019-04-08 NOTE — TELEPHONE ENCOUNTER
Pt calling back with Fax number for his blood work order to be sent to   Fax #[phone]  Pt is wanting to get blood work done before he comes in to see [facility] so that he can discuss results at his visit   Pt is asking for a call back when faxed so he can make appt

## 2019-04-18 ENCOUNTER — OFFICE VISIT (OUTPATIENT)
Dept: ORTHOPEDIC SURGERY | Age: 74
End: 2019-04-18
Payer: MEDICARE

## 2019-04-18 VITALS
RESPIRATION RATE: 16 BRPM | BODY MASS INDEX: 33.05 KG/M2 | TEMPERATURE: 98.1 F | HEIGHT: 72 IN | SYSTOLIC BLOOD PRESSURE: 150 MMHG | HEART RATE: 68 BPM | DIASTOLIC BLOOD PRESSURE: 76 MMHG | WEIGHT: 244 LBS

## 2019-04-18 DIAGNOSIS — Z96.641 HISTORY OF TOTAL HIP ARTHROPLASTY, RIGHT: Primary | ICD-10-CM

## 2019-04-18 PROCEDURE — 99213 OFFICE O/P EST LOW 20 MIN: CPT | Performed by: ORTHOPAEDIC SURGERY

## 2019-04-18 NOTE — PROGRESS NOTES
This dictation was done with [employer] dictation and may contain mechanical errors related to translation. Blood pressure (!) 150/76, pulse 68, temperature 98.1 °F (36.7 °C), temperature source Temporal, resp. rate 16, height 6' (1.829 m), weight 244 lb (110.7 kg).

## 2019-04-26 NOTE — PROGRESS NOTES
Patient is a 40-year-old male who presents today for further evaluation follow-up of his right hip arthroplasty. This is a gentleman who underwent metal-on-metal right total hip arthroplasty performed by another surgeon in the past.  He presented to us about 1 year ago and was complaining of hip pain and loss of range of motion. At that time he was worked up for possible chromium toxicity and had a negative overall evaluation for this. He underwent attempted revision for removal metal-on-metal prosthesis on 2/23/2018. This attempt was aborted by the fact that he had cold fused his metal-on-metal implant. He underwent repair of the gluteus medius and minimus tendons at that time. Patient continues to do reasonably well continues to complain of pain however he states it is not a life style threatening at this point time. The patient states he plays golf without any significant issues. Recently the patient underwent a chromium blood plasma level which was 2.4 which is less than 5 in guarded isn't normal.  He's here for further evaluation. Patient Active Problem List   Diagnosis    Hyperlipidemia    Impotence    Benign prostatic hyperplasia    Essential hypertension    Type 2 diabetes mellitus without complication, without long-term current use of insulin ([facility])    Radiculopathy of lumbosacral region    Prostate cancer ([facility] [address]    Tear of gluteus minimus tendon    Primary osteoarthritis of right hip    Intractable hiccups    Slow transit constipation     Prior to Visit Medications    Medication Sig Taking?  Authorizing Provider   aspirin 81 MG tablet Take 81 mg by mouth daily  Historical Provider, MD   omeprazole (PRILOSEC) 40 MG delayed release capsule TAKE 1 CAPSULE DAILY  Delorse Bence, MD   tamsulosin (FLOMAX) 0.4 MG capsule TAKE 1 CAPSULE EVERY EVENING  Delorse Bence, MD   pravastatin (PRAVACHOL) 40 MG tablet TAKE 1 TABLET DAILY  Delorse Bence, MD   lisinopril radiographically. Plan we had a 15 minute face-to-face discussion with this patient of which greater than 50% of time was spent on counseling about further care and treatment of his metal-on-metal hip arthroplasty. I believe that they yearly evaluation radiographically is probably necessary. Also serum chromium and serum ion levels may need to be checked on a relatively frequent basis. We did discuss with him the fact that going away to completely remove any concern of metal-on-metal wear would be for him to undergo complete revision total hip arthroplasty. At this point time the patient is very functional plays golf and is doing otherwise well. Plan to follow him up on a yearly basis.

## 2020-03-26 ENCOUNTER — TELEPHONE (OUTPATIENT)
Dept: ORTHOPEDIC SURGERY | Age: 75
End: 2020-03-26

## 2020-06-11 ENCOUNTER — OFFICE VISIT (OUTPATIENT)
Dept: ORTHOPEDIC SURGERY | Age: 75
End: 2020-06-11
Payer: MEDICARE

## 2020-06-11 VITALS — TEMPERATURE: 97.9 F

## 2020-06-11 PROCEDURE — 99213 OFFICE O/P EST LOW 20 MIN: CPT | Performed by: ORTHOPAEDIC SURGERY

## 2020-06-15 NOTE — PROGRESS NOTES
MD   omeprazole (PRILOSEC) 40 MG delayed release capsule TAKE 1 CAPSULE DAILY  Wing Kelechi MD   lisinopril (PRINIVIL;ZESTRIL) 10 MG tablet TAKE 1 TABLET DAILY  Wing Kelechi MD   verapamil (CALAN SR) 180 MG extended release tablet TAKE 1 TABLET DAILY  Wing Kelechi MD   tamsulosin (FLOMAX) 0.4 MG capsule TAKE 1 CAPSULE EVERY EVENING  Wing Kelechi MD   aspirin 81 MG tablet Take 81 mg by mouth daily  Historical Provider, MD   Cyanocobalamin (VITAMIN B-12) 1000 MCG SUBL Place under the tongue 2 times per week on Mondays and Fridays     vitamin D (CHOLECALCIFEROL) 1000 UNIT TABS tablet Take 1,000 Units by mouth daily  Historical Provider, MD   CALCIUM CITRATE PO Take 1,000 mcg by mouth 4 times daily   Historical Provider, MD   Multiple Vitamin (MULTI-VITAMIN PO) Take 1 tablet by mouth 2 times daily. Historical Provider, MD     Physical examination 77-year-old male oriented x3 temperature is 97.9. Examination of the right hip shows no septic signs of instability or deep sepsis. He has a well-healed anterior wound and good range of motion of the hip joint. He does complain of pain however this is lost debilitating. Motor exam shows quadriceps hamstrings hip abductors abductors foot plantar dorsiflexors are intact 4-5 over 5. No obvious cutaneous lesions or cellulitic processes are noted in the right lower extremity. X-rays obtained AP pelvis AP lateral of the right hip show status post uncemented right total hip arthroplasty. This is a metal-on-metal device and appears to be in stable overall orientation and no signs of instability or deep sepsis. No signs of ostial lysis or any other bony damage related to possible ALVAL lesions. Mild degenerative arthritis in the contralateral left hip is noted. No other obvious fractures tumors or dislocations are seen on these x-rays.     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 The only way to really remove the metal-on-metal issue is to undergo complete revision with probable trochanteric osteotomy. I do not think this patient is interested in this and I do not think it would necessarily relieve any of the low-grade pain he has. Plan we had a 15-minute face-to-face discussion with this patient of which greater than 50% of time was spent on counseling about further care and treatment of his uncemented metal-on-metal hip prosthesis. I believe chromium levels may be important to follow on a yearly basis along with x-ray evaluation. We discussed this with him and will see him back in 1 year or as needed.

## 2020-12-14 LAB — LEFT VENTRICULAR EJECTION FRACTION, EXTERNAL: NORMAL

## 2021-03-11 DIAGNOSIS — Z11.59 ENCOUNTER FOR HEPATITIS C SCREENING TEST FOR LOW RISK PATIENT: ICD-10-CM

## 2021-03-11 DIAGNOSIS — E11.9 TYPE 2 DIABETES MELLITUS WITHOUT COMPLICATION, WITHOUT LONG-TERM CURRENT USE OF INSULIN (HCC): ICD-10-CM

## 2021-03-11 LAB
A/G RATIO: 1.3 (ref 1.1–2.2)
ALBUMIN SERPL-MCNC: 4.2 G/DL (ref 3.4–5)
ALP BLD-CCNC: 72 U/L (ref 40–129)
ALT SERPL-CCNC: 23 U/L (ref 10–40)
ANION GAP SERPL CALCULATED.3IONS-SCNC: 14 MMOL/L (ref 3–16)
AST SERPL-CCNC: 33 U/L (ref 15–37)
BILIRUB SERPL-MCNC: 0.5 MG/DL (ref 0–1)
BUN BLDV-MCNC: 8 MG/DL (ref 7–20)
CALCIUM SERPL-MCNC: 9.3 MG/DL (ref 8.3–10.6)
CHLORIDE BLD-SCNC: 104 MMOL/L (ref 99–110)
CO2: 25 MMOL/L (ref 21–32)
CREAT SERPL-MCNC: 0.7 MG/DL (ref 0.8–1.3)
GFR AFRICAN AMERICAN: >60
GFR NON-AFRICAN AMERICAN: >60
GLOBULIN: 3.3 G/DL
GLUCOSE BLD-MCNC: 166 MG/DL (ref 70–99)
HEPATITIS C ANTIBODY INTERPRETATION: NORMAL
LDL CHOLESTEROL DIRECT: 63 MG/DL
POTASSIUM SERPL-SCNC: 4.2 MMOL/L (ref 3.5–5.1)
SODIUM BLD-SCNC: 143 MMOL/L (ref 136–145)
TOTAL PROTEIN: 7.5 G/DL (ref 6.4–8.2)

## 2021-03-12 LAB
ESTIMATED AVERAGE GLUCOSE: 180 MG/DL
HBA1C MFR BLD: 7.9 %

## 2021-07-27 DIAGNOSIS — E11.9 TYPE 2 DIABETES MELLITUS WITHOUT COMPLICATION, WITHOUT LONG-TERM CURRENT USE OF INSULIN (HCC): ICD-10-CM

## 2021-07-27 DIAGNOSIS — C61 PROSTATE CANCER (HCC): ICD-10-CM

## 2021-07-27 LAB
A/G RATIO: 1.8 (ref 1.1–2.2)
ALBUMIN SERPL-MCNC: 4.7 G/DL (ref 3.4–5)
ALP BLD-CCNC: 65 U/L (ref 40–129)
ALT SERPL-CCNC: 19 U/L (ref 10–40)
ANION GAP SERPL CALCULATED.3IONS-SCNC: 13 MMOL/L (ref 3–16)
AST SERPL-CCNC: 22 U/L (ref 15–37)
BASOPHILS ABSOLUTE: 0.1 K/UL (ref 0–0.2)
BASOPHILS RELATIVE PERCENT: 0.9 %
BILIRUB SERPL-MCNC: 0.4 MG/DL (ref 0–1)
BUN BLDV-MCNC: 10 MG/DL (ref 7–20)
CALCIUM SERPL-MCNC: 9.6 MG/DL (ref 8.3–10.6)
CHLORIDE BLD-SCNC: 101 MMOL/L (ref 99–110)
CO2: 28 MMOL/L (ref 21–32)
CREAT SERPL-MCNC: 0.6 MG/DL (ref 0.8–1.3)
EOSINOPHILS ABSOLUTE: 0.2 K/UL (ref 0–0.6)
EOSINOPHILS RELATIVE PERCENT: 3 %
GFR AFRICAN AMERICAN: >60
GFR NON-AFRICAN AMERICAN: >60
GLOBULIN: 2.6 G/DL
GLUCOSE BLD-MCNC: 169 MG/DL (ref 70–99)
HCT VFR BLD CALC: 44.4 % (ref 40.5–52.5)
HEMOGLOBIN: 15 G/DL (ref 13.5–17.5)
LDL CHOLESTEROL DIRECT: 70 MG/DL
LYMPHOCYTES ABSOLUTE: 1.1 K/UL (ref 1–5.1)
LYMPHOCYTES RELATIVE PERCENT: 19.4 %
MCH RBC QN AUTO: 30.7 PG (ref 26–34)
MCHC RBC AUTO-ENTMCNC: 33.7 G/DL (ref 31–36)
MCV RBC AUTO: 91.1 FL (ref 80–100)
MONOCYTES ABSOLUTE: 0.4 K/UL (ref 0–1.3)
MONOCYTES RELATIVE PERCENT: 6.7 %
NEUTROPHILS ABSOLUTE: 4 K/UL (ref 1.7–7.7)
NEUTROPHILS RELATIVE PERCENT: 70 %
PDW BLD-RTO: 13.7 % (ref 12.4–15.4)
PLATELET # BLD: 196 K/UL (ref 135–450)
PMV BLD AUTO: 9.2 FL (ref 5–10.5)
POTASSIUM SERPL-SCNC: 4.9 MMOL/L (ref 3.5–5.1)
RBC # BLD: 4.88 M/UL (ref 4.2–5.9)
SODIUM BLD-SCNC: 142 MMOL/L (ref 136–145)
TOTAL PROTEIN: 7.3 G/DL (ref 6.4–8.2)
WBC # BLD: 5.6 K/UL (ref 4–11)

## 2021-07-28 LAB
ESTIMATED AVERAGE GLUCOSE: 177.2 MG/DL
HBA1C MFR BLD: 7.8 %

## 2022-03-15 ENCOUNTER — APPOINTMENT (OUTPATIENT)
Dept: CT IMAGING | Age: 77
DRG: 274 | End: 2022-03-15
Payer: MEDICARE

## 2022-03-15 ENCOUNTER — HOSPITAL ENCOUNTER (INPATIENT)
Age: 77
LOS: 2 days | Discharge: HOME | DRG: 274 | End: 2022-03-17
Attending: EMERGENCY MEDICINE | Admitting: INTERNAL MEDICINE
Payer: MEDICARE

## 2022-03-15 DIAGNOSIS — I48.91 ATRIAL FIBRILLATION WITH RAPID VENTRICULAR RESPONSE (HCC): Primary | ICD-10-CM

## 2022-03-15 DIAGNOSIS — K62.5 BRBPR (BRIGHT RED BLOOD PER RECTUM): ICD-10-CM

## 2022-03-15 LAB
A/G RATIO: 1.4 (ref 1.1–2.2)
ABO/RH: NORMAL
ALBUMIN SERPL-MCNC: 3.8 G/DL (ref 3.4–5)
ALP BLD-CCNC: 55 U/L (ref 40–129)
ALT SERPL-CCNC: 16 U/L (ref 10–40)
ANION GAP SERPL CALCULATED.3IONS-SCNC: 10 MMOL/L (ref 3–16)
ANTIBODY SCREEN: NORMAL
AST SERPL-CCNC: 18 U/L (ref 15–37)
BASOPHILS ABSOLUTE: 0.1 K/UL (ref 0–0.2)
BASOPHILS RELATIVE PERCENT: 0.9 %
BILIRUB SERPL-MCNC: 0.3 MG/DL (ref 0–1)
BUN BLDV-MCNC: 14 MG/DL (ref 7–20)
CALCIUM SERPL-MCNC: 9 MG/DL (ref 8.3–10.6)
CHLORIDE BLD-SCNC: 103 MMOL/L (ref 99–110)
CO2: 25 MMOL/L (ref 21–32)
CREAT SERPL-MCNC: 0.5 MG/DL (ref 0.8–1.3)
EKG ATRIAL RATE: 47 BPM
EKG DIAGNOSIS: NORMAL
EKG Q-T INTERVAL: 346 MS
EKG QRS DURATION: 140 MS
EKG QTC CALCULATION (BAZETT): 514 MS
EKG R AXIS: 104 DEGREES
EKG T AXIS: -60 DEGREES
EKG VENTRICULAR RATE: 133 BPM
EOSINOPHILS ABSOLUTE: 0.2 K/UL (ref 0–0.6)
EOSINOPHILS RELATIVE PERCENT: 2.5 %
GFR AFRICAN AMERICAN: >60
GFR NON-AFRICAN AMERICAN: >60
GLUCOSE BLD-MCNC: 135 MG/DL (ref 70–99)
GLUCOSE BLD-MCNC: 161 MG/DL (ref 70–99)
HCT VFR BLD CALC: 45.5 % (ref 40.5–52.5)
HEMOGLOBIN: 15.2 G/DL (ref 13.5–17.5)
INR BLD: 1.08 (ref 0.88–1.12)
LYMPHOCYTES ABSOLUTE: 1.3 K/UL (ref 1–5.1)
LYMPHOCYTES RELATIVE PERCENT: 19.9 %
MCH RBC QN AUTO: 30.5 PG (ref 26–34)
MCHC RBC AUTO-ENTMCNC: 33.4 G/DL (ref 31–36)
MCV RBC AUTO: 91.2 FL (ref 80–100)
MONOCYTES ABSOLUTE: 0.6 K/UL (ref 0–1.3)
MONOCYTES RELATIVE PERCENT: 8.5 %
NEUTROPHILS ABSOLUTE: 4.6 K/UL (ref 1.7–7.7)
NEUTROPHILS RELATIVE PERCENT: 68.2 %
OCCULT BLOOD DIAGNOSTIC: ABNORMAL
PDW BLD-RTO: 14 % (ref 12.4–15.4)
PERFORMED ON: ABNORMAL
PLATELET # BLD: 219 K/UL (ref 135–450)
PMV BLD AUTO: 9 FL (ref 5–10.5)
POTASSIUM SERPL-SCNC: 4.1 MMOL/L (ref 3.5–5.1)
PROTHROMBIN TIME: 12.2 SEC (ref 9.9–12.7)
RBC # BLD: 4.99 M/UL (ref 4.2–5.9)
SODIUM BLD-SCNC: 138 MMOL/L (ref 136–145)
TOTAL PROTEIN: 6.6 G/DL (ref 6.4–8.2)
TSH REFLEX: 1.01 UIU/ML (ref 0.27–4.2)
WBC # BLD: 6.7 K/UL (ref 4–11)

## 2022-03-15 PROCEDURE — 96365 THER/PROPH/DIAG IV INF INIT: CPT

## 2022-03-15 PROCEDURE — 80053 COMPREHEN METABOLIC PANEL: CPT

## 2022-03-15 PROCEDURE — 99284 EMERGENCY DEPT VISIT MOD MDM: CPT

## 2022-03-15 PROCEDURE — 6360000004 HC RX CONTRAST MEDICATION: Performed by: EMERGENCY MEDICINE

## 2022-03-15 PROCEDURE — 2580000003 HC RX 258: Performed by: EMERGENCY MEDICINE

## 2022-03-15 PROCEDURE — 85610 PROTHROMBIN TIME: CPT

## 2022-03-15 PROCEDURE — 86900 BLOOD TYPING SEROLOGIC ABO: CPT

## 2022-03-15 PROCEDURE — 86901 BLOOD TYPING SEROLOGIC RH(D): CPT

## 2022-03-15 PROCEDURE — 84443 ASSAY THYROID STIM HORMONE: CPT

## 2022-03-15 PROCEDURE — 2060000000 HC ICU INTERMEDIATE R&B

## 2022-03-15 PROCEDURE — 85025 COMPLETE CBC W/AUTO DIFF WBC: CPT

## 2022-03-15 PROCEDURE — 6370000000 HC RX 637 (ALT 250 FOR IP): Performed by: INTERNAL MEDICINE

## 2022-03-15 PROCEDURE — 2580000003 HC RX 258: Performed by: INTERNAL MEDICINE

## 2022-03-15 PROCEDURE — G0328 FECAL BLOOD SCRN IMMUNOASSAY: HCPCS

## 2022-03-15 PROCEDURE — 2500000003 HC RX 250 WO HCPCS: Performed by: INTERNAL MEDICINE

## 2022-03-15 PROCEDURE — 96361 HYDRATE IV INFUSION ADD-ON: CPT

## 2022-03-15 PROCEDURE — 2500000003 HC RX 250 WO HCPCS: Performed by: EMERGENCY MEDICINE

## 2022-03-15 PROCEDURE — 74174 CTA ABD&PLVS W/CONTRAST: CPT

## 2022-03-15 PROCEDURE — 93005 ELECTROCARDIOGRAM TRACING: CPT | Performed by: EMERGENCY MEDICINE

## 2022-03-15 PROCEDURE — 86850 RBC ANTIBODY SCREEN: CPT

## 2022-03-15 PROCEDURE — 93010 ELECTROCARDIOGRAM REPORT: CPT | Performed by: INTERNAL MEDICINE

## 2022-03-15 RX ORDER — ACETAMINOPHEN 325 MG/1
650 TABLET ORAL EVERY 6 HOURS PRN
Status: DISCONTINUED | OUTPATIENT
Start: 2022-03-15 | End: 2022-03-17

## 2022-03-15 RX ORDER — SODIUM CHLORIDE 0.9 % (FLUSH) 0.9 %
5-40 SYRINGE (ML) INJECTION EVERY 12 HOURS SCHEDULED
Status: DISCONTINUED | OUTPATIENT
Start: 2022-03-15 | End: 2022-03-17 | Stop reason: HOSPADM

## 2022-03-15 RX ORDER — POLYETHYLENE GLYCOL 3350 17 G/17G
17 POWDER, FOR SOLUTION ORAL DAILY PRN
Status: DISCONTINUED | OUTPATIENT
Start: 2022-03-15 | End: 2022-03-17 | Stop reason: HOSPADM

## 2022-03-15 RX ORDER — ONDANSETRON 2 MG/ML
4 INJECTION INTRAMUSCULAR; INTRAVENOUS EVERY 6 HOURS PRN
Status: DISCONTINUED | OUTPATIENT
Start: 2022-03-15 | End: 2022-03-17 | Stop reason: HOSPADM

## 2022-03-15 RX ORDER — SODIUM CHLORIDE 0.9 % (FLUSH) 0.9 %
5-40 SYRINGE (ML) INJECTION PRN
Status: DISCONTINUED | OUTPATIENT
Start: 2022-03-15 | End: 2022-03-17 | Stop reason: HOSPADM

## 2022-03-15 RX ORDER — PANTOPRAZOLE SODIUM 40 MG/1
40 TABLET, DELAYED RELEASE ORAL
Status: DISCONTINUED | OUTPATIENT
Start: 2022-03-16 | End: 2022-03-17 | Stop reason: HOSPADM

## 2022-03-15 RX ORDER — ONDANSETRON 4 MG/1
4 TABLET, ORALLY DISINTEGRATING ORAL EVERY 8 HOURS PRN
Status: DISCONTINUED | OUTPATIENT
Start: 2022-03-15 | End: 2022-03-17 | Stop reason: HOSPADM

## 2022-03-15 RX ORDER — TAMSULOSIN HYDROCHLORIDE 0.4 MG/1
0.4 CAPSULE ORAL EVERY EVENING
Status: DISCONTINUED | OUTPATIENT
Start: 2022-03-15 | End: 2022-03-17 | Stop reason: HOSPADM

## 2022-03-15 RX ORDER — SODIUM CHLORIDE 9 MG/ML
INJECTION, SOLUTION INTRAVENOUS CONTINUOUS
Status: DISCONTINUED | OUTPATIENT
Start: 2022-03-15 | End: 2022-03-16

## 2022-03-15 RX ORDER — DILTIAZEM HYDROCHLORIDE 5 MG/ML
10 INJECTION INTRAVENOUS ONCE
Status: COMPLETED | OUTPATIENT
Start: 2022-03-15 | End: 2022-03-15

## 2022-03-15 RX ORDER — PRAVASTATIN SODIUM 40 MG
40 TABLET ORAL DAILY
Status: DISCONTINUED | OUTPATIENT
Start: 2022-03-16 | End: 2022-03-17 | Stop reason: HOSPADM

## 2022-03-15 RX ORDER — SODIUM CHLORIDE 9 MG/ML
25 INJECTION, SOLUTION INTRAVENOUS PRN
Status: DISCONTINUED | OUTPATIENT
Start: 2022-03-15 | End: 2022-03-17 | Stop reason: HOSPADM

## 2022-03-15 RX ORDER — 0.9 % SODIUM CHLORIDE 0.9 %
1000 INTRAVENOUS SOLUTION INTRAVENOUS ONCE
Status: COMPLETED | OUTPATIENT
Start: 2022-03-15 | End: 2022-03-15

## 2022-03-15 RX ORDER — ACETAMINOPHEN 650 MG/1
650 SUPPOSITORY RECTAL EVERY 6 HOURS PRN
Status: DISCONTINUED | OUTPATIENT
Start: 2022-03-15 | End: 2022-03-17 | Stop reason: HOSPADM

## 2022-03-15 RX ORDER — 0.9 % SODIUM CHLORIDE 0.9 %
500 INTRAVENOUS SOLUTION INTRAVENOUS ONCE
Status: COMPLETED | OUTPATIENT
Start: 2022-03-15 | End: 2022-03-15

## 2022-03-15 RX ADMIN — DILTIAZEM HYDROCHLORIDE 10 MG/HR: 5 INJECTION INTRAVENOUS at 18:32

## 2022-03-15 RX ADMIN — SODIUM CHLORIDE 1000 ML: 9 INJECTION, SOLUTION INTRAVENOUS at 12:32

## 2022-03-15 RX ADMIN — SODIUM CHLORIDE 500 ML: 9 INJECTION, SOLUTION INTRAVENOUS at 22:01

## 2022-03-15 RX ADMIN — SODIUM CHLORIDE: 9 INJECTION, SOLUTION INTRAVENOUS at 23:10

## 2022-03-15 RX ADMIN — DILTIAZEM HYDROCHLORIDE 10 MG: 5 INJECTION INTRAVENOUS at 14:51

## 2022-03-15 RX ADMIN — IOPAMIDOL 75 ML: 755 INJECTION, SOLUTION INTRAVENOUS at 13:14

## 2022-03-15 RX ADMIN — SODIUM CHLORIDE, PRESERVATIVE FREE 10 ML: 5 INJECTION INTRAVENOUS at 21:11

## 2022-03-15 RX ADMIN — TAMSULOSIN HYDROCHLORIDE 0.4 MG: 0.4 CAPSULE ORAL at 21:15

## 2022-03-15 RX ADMIN — DILTIAZEM HYDROCHLORIDE 2.5 MG/HR: 5 INJECTION INTRAVENOUS at 15:01

## 2022-03-15 ASSESSMENT — PAIN SCALES - GENERAL
PAINLEVEL_OUTOF10: 0

## 2022-03-15 NOTE — ED NOTES
Patient ambulatory to restroom with steady gait. No signs of acute distress noted. Pt denies any dizziness at this time. Cardizem gtt increased, see MAR. Call light within reach.       Yesi Ruiz RN  03/15/22 [id number]

## 2022-03-15 NOTE — PROGRESS NOTES
4 Eyes Skin Assessment     NAME:  Clement Salas  YOB: 1945  MEDICAL RECORD NUMBER:  [id number]    The patient is being assess for  Admission    I agree that 2 RN's have performed a thorough Head to Toe Skin Assessment on the patient. ALL assessment sites listed below have been assessed. Areas assessed by both nurses:    Head, Face, Ears, Shoulders, Back, Chest, Arms, Elbows, Hands, Sacrum. Buttock, Coccyx, Ischium and Legs. Feet and Heels        Does the Patient have a Wound?  No noted wound(s)       [place] Prevention initiated:  No   Wound Care Orders initiated:  No    Pressure Injury (Stage 3,4, Unstageable, DTI, NWPT, and Complex wounds) if present place consult order under [de-identified] No    New and Established Ostomies if present place consult order under : No      Nurse 1 eSignature: Electronically signed by Bailee Major RN on 3/15/22 at 6:50 PM EDT    **SHARE this note so that the co-signing nurse is able to place an eSignature**    Nurse 2 eSignature: Electronically signed by Dia Closs, RN on 3/17/09 at 5:56 AM EDT

## 2022-03-15 NOTE — PLAN OF CARE
Problem: Falls - Risk of:  Goal: Will remain free from falls  Description: Will remain free from falls  Outcome: Ongoing     Problem: Infection:  Goal: Will remain free from infection  Description: Will remain free from infection  Outcome: Ongoing     Problem: Daily Care:  Goal: Daily care needs are met  Description: Daily care needs are met  Outcome: Ongoing     Problem: Discharge Planning:  Goal: Patients continuum of care needs are met  Description: Patients continuum of care needs are met  Outcome: Ongoing

## 2022-03-15 NOTE — PROGRESS NOTES
[facility]    Patient just seen by me in 12/21 for a colonoscopy (history of colonic polyps) - sigmoid diverticulosis only  He has had several episodes of BRBPR today after partial manual stimulation of the anal canal in order to induce a BM  No bleeding since  Hgb normal  CTA negative for active bleeding  He is now in atrial fibrillation with RVR - this is the likely explanation for the hypotension and tachycardia, not the bleeding    REC:  Clear liquid diet  Serial H/H as ordered  The bleeding would appear to be either perianal or limited diverticular bleeding  Should the bleeding stop (likely), no further GI evaluation will probably be necessary given the very recent colonoscopy  The Cardiology service will evaluate

## 2022-03-15 NOTE — PROGRESS NOTES
Medication Reconciliation     List of medications patient is currently taking is complete     Source of information:  Conversation with patient  EPIC records        Notes regarding home medications:   Patient received all home medications today, prior to arrival to the emergency department      Nayana Escalante Pharmacy Student  03/15/22 14:47

## 2022-03-15 NOTE — PROGRESS NOTES
Pt has been admitted from ED to room 5260. Pt is alert and oriented. Pt has IV infusing cardizem per MAR. Pts vitals are taken and heart monitor is on and verified with [facility]. Pt is instructed on how to use the call light when needing assistance. Admission has been initiated and orders are reviewed.      Electronically signed by Marquita Celeste RN on 3/15/2022 at 6:50 PM

## 2022-03-15 NOTE — ED PROVIDER NOTES
[facility]  eMERGENCY dEPARTMENT eNCOUnter      Pt Name: Zain Don  MRN: [id number]  [place] 1945  Date of evaluation: 3/15/2022  Provider: Skyler White MD    [address] [place] [address]       Chief Complaint   Patient presents with    Rectal Bleeding     onset today with bowel movement. bright red blood. reports dizziness and shortness of breath. CRITICAL CARE TIME   Total Critical Care time was a minimum of 35 minutes, excluding separately reportable procedures. There was a high probability of clinically significant/life threatening deterioration in the patient's condition which required my urgent intervention. Critical care time includes my initial evaluation, ongoing reassessment, review of old records, review of laboratory, EKG, chest x-ray, CT scan, consultation with the patient's primary care provider. No procedure time was included. HISTORY OF PRESENT ILLNESS  (Location/Symptom, Timing/Onset, Context/Setting, Quality, Duration, Modifying Factors, Severity.)   Zain Don is a 64 y.o. male who presents to the emergency department with rectal bleeding. He states he will was constipated this morning. He states he wiped just prior to trying to have a bowel movement noted a little bit of blood. He had a bowel movement looked in the toilet and noted some clots in the toilet. He saw his primary care physician who evaluated him and noted that his heart rate was 140 and his blood pressure was 90 systolic. His primary care provider contacted me to make me aware that the patient was being transported here by EMS. He denies dizziness. He denies passing out. He states he had a colonoscopy about 3 months ago, but states that there was some concern that his bowel prep had not been adequate and that his evaluation was not adequate due to the bowel prep. Nursing Notes were reviewed and I agree.     REVIEW OF SYSTEMS    (2-9 systems for level 4, 10 or more for level 5)     HEENT: Negative. Cardiovascular: No chest pain. Pulmonary: No shortness of breath or cough. GI: No abdominal pain nausea or vomiting. Bright red blood per rectum x1 this morning. Constipation. : No frequency urgency or dysuria. Neuro: No dizziness or passing out. Except as noted above the remainder of the review of systems was reviewed and negative. PAST MEDICAL HISTORY     Past Medical History:   Diagnosis Date    Arthritis     Hyperlipidemia     Hypertension     Prostate cancer ([facility] [address] 12/2014    Focal Prostate Cancer found on pathology report at the time of TURP    Sleep apnea     no BiPap since gastric surgery    Spinal stenosis     Type II or unspecified type diabetes mellitus without mention of complication, not stated as uncontrolled     diet controlled since gastric surgery         SURGICAL HISTORY       Past Surgical History:   Procedure Laterality Date    CHOLECYSTECTOMY  1980's    COLONOSCOPY  07/24/2014    Dr Elizabeth Poole, polyp, repeat 3 years    COLONOSCOPY  08/15/2017    Dr Amirah Grace- sigmoid diverticulosis, small adenomatous polyp, repeat in 5 years    COLONOSCOPY  12/27/2021    Manegold- sigmoid diverticulosis, repeat in 5 years    FOOT SURGERY  1980's    Left bone fused    JOINT REPLACEMENT      THR    PROSTATE SURGERY  2004 approx    TURP ([facility])    ROTATOR CUFF REPAIR Bilateral     left 6/2016.   left x 2, right x 1    IDANIA-EN-Y GASTRIC BYPASS  12/2012    [facility]    SHOULDER SURGERY Bilateral     rotator cuff    TOTAL HIP ARTHROPLASTY  12/2007    Right    TURP  12/09/2014    cysto TURP, Focal Prostate Cancer on Pathology         CURRENT MEDICATIONS       Previous Medications    ASPIRIN 81 MG TABLET    Take 81 mg by mouth daily    CALCIUM CITRATE PO    Take 1,000 mcg by mouth 4 times daily     CYANOCOBALAMIN (VITAMIN B-12) 1000 MCG SUBL    Place 1 tablet under the tongue once a week     DAPAGLIFLOZIN (FARXIGA) 5 MG TABLET    Take 1 tablet by mouth every morning    LISINOPRIL (PRINIVIL;ZESTRIL) 20 MG TABLET    TAKE 1 TABLET DAILY    METFORMIN (GLUCOPHAGE) 1000 MG TABLET    TAKE 1 TABLET TWICE A DAY WITH MEALS    MULTIPLE VITAMIN (MULTI-VITAMIN PO)    Take 1 tablet by mouth 2 times daily. OMEPRAZOLE (PRILOSEC) 40 MG DELAYED RELEASE CAPSULE    TAKE 1 CAPSULE DAILY    PRAVASTATIN (PRAVACHOL) 40 MG TABLET    TAKE 1 TABLET DAILY    TAMSULOSIN (FLOMAX) 0.4 MG CAPSULE    TAKE 1 CAPSULE EVERY EVENING    VERAPAMIL (CALAN SR) 180 MG EXTENDED RELEASE TABLET    TAKE 1 TABLET DAILY    VITAMIN D (CHOLECALCIFEROL) 1000 UNIT TABS TABLET    Take 1,000 Units by mouth daily       ALLERGIES     Horse protein    FAMILY HISTORY       Family History   Problem Relation Age of Onset    Coronary Art Dis Mother     Lung Cancer Mother     Stroke Mother     Coronary Art Dis Father     Breast Cancer Sister           SOCIAL HISTORY       Social History     Socioeconomic History    Marital status:       Spouse name: None    Number of children: None    Years of education: None    Highest education level: None   Occupational History    Occupation: retired   Tobacco Use    Smoking status: Former Smoker     Packs/day: 0.25     Years: 7.00     Pack years: 1.75     Types: Cigarettes     Start date: 12/10/1964     Quit date: 1980     Years since quittin.2    Smokeless tobacco: Never Used   Vaping Use    Vaping Use: Never used   Substance and Sexual Activity    Alcohol use: Yes     Comment: One glass of one each day    Drug use: No    Sexual activity: None   Other Topics Concern    None   Social History Narrative    None     Social Determinants of Health     Financial Resource Strain: Low Risk     Difficulty of Paying Living Expenses: Not hard at all   Food Insecurity: No Food Insecurity    Worried About Running Out of Food in the Last Year: Never true    Howard of Food in the Last Year: Never true   Transportation Needs:     Lack of Transportation (Medical): Not on file    Lack of Transportation (Non-Medical): Not on file   Physical Activity:     Days of Exercise per Week: Not on file    Minutes of Exercise per Session: Not on file   Stress:     Feeling of Stress : Not on file   Social Connections:     Frequency of Communication with Friends and Family: Not on file    Frequency of Social Gatherings with Friends and Family: Not on file    Attends [employer] Services: Not on file    Active Member of [address] [place] [address] [employer] or Organizations: Not on file    Attends Club or Organization Meetings: Not on file    Marital Status: Not on file   Intimate Partner Violence:     Fear of Current or Ex-Partner: Not on file    Emotionally Abused: Not on file    Physically Abused: Not on file    Sexually Abused: Not on file   Housing Stability:     Unable to Pay for Housing in the Last Year: Not on file    Number of [place] in the Last Year: Not on file    Unstable Housing in the Last Year: Not on file         PHYSICAL EXAM    (up to 7 for level 4, 8 or more for level 5)     ED Triage Vitals [03/15/22 1200]   BP Temp Temp Source Pulse Resp SpO2 Height Weight   122/88 98.3 °F (36.8 °C) Oral 133 12 97 % -- --       General: Alert well-appearing white male no acute distress. Head: Atraumatic and normocephalic. Eyes: No conjunctival injection. No pallor. Pupils equal round reactive. ENT: Veronica Oms is clear. Oropharynx is moist without erythema. Neck: Supple without adenopathy, nontender. Heart: Regular rate and rhythm. No murmurs or gallops noted. Lungs: Breath sounds equal bilaterally and clear. Abdomen: Soft, nondistended, nontender. No mass organomegaly. Bowel sounds are normal.  Rectal exam: No external hemorrhoids. No external signs of bleeding. A small amount of blood-tinged mucus on rectal.  No stool. No masses. Skin: Warm and dry, good turgor. No pallor or cyanosis. Neuro: Awake, alert, oriented. Symmetrical reactive pupils. Intact extraocular movements. No facial asymmetry. Symmetrical motor function. DIFFERENTIAL DIAGNOSIS   Differential includes but is not limited to colon cancer, diverticulosis with bleeding, colitis, anal fissure, hemorrhoid, other. DIAGNOSTIC RESULTS     EKG: All EKG's are interpreted by Donna Jarvis MD in the absence of a cardiologist.    EKG #1: Wide QRS tachycardia, regular, right bundle branch block pattern, inferior ischemic changes. Rhythm strip shows a wide-complex tachycardia with a rate of 133,  ms with no other ectopy as interpreted by me. This was compared to an EKG dated 12/10/2020. The tachycardia is new. EKG #2: Atrial fibrillation with RVR, rate of 125, right bundle branch block, inferior ischemic changes. Rhythm strip shows atrial fibrillation with RVR with a rate of 125, QRS of 138 ms with no other ectopy as interpreted by me. Compared to EKG done earlier today, the previously noted regular wide-complex QRS tachycardia was likely atrial fibrillation as noted in the current tracing. RADIOLOGY:   Non-plain film images such as CT, Ultrasound and MRI are read by the radiologist. Plain radiographic images are visualized and preliminarily interpreted Donna Jarvis MD with the below findings:      Interpretation per the Radiologist below, if available at the time of this note:    CTA ABDOMEN PELVIS W WO CONTRAST   Preliminary Result   1. No evidence of active GI bleeding. 2. Moderate aortoiliac atherosclerotic disease. No acute aortic pathology. No significant visceral stenosis. 3. Colonic diverticulosis with no acute features. 4. Grade 1-2 anterolisthesis of L5 on S1 with bilateral pars defect. Multilevel degenerative disc disease within the lumbar spine.                ED BEDSIDE ULTRASOUND:   Performed by ED Physician - none    LABS:  Labs Reviewed   COMPREHENSIVE METABOLIC PANEL - Abnormal; Notable for the following components:       Result Value    Glucose 135 (*)     CREATININE 0.5 (*)     All other components within normal limits   BLOOD OCCULT STOOL DIAGNOSTIC - Abnormal; Notable for the following components:    Occult Blood Diagnostic   (*)     Value: Result: POSITIVE  Normal range: Negative      All other components within normal limits    Narrative:     ORDER#: [id number]                          ORDERED BY: DAVY OBRIEN  SOURCE: Stool                              COLLECTED:  03/15/22 12:05  ANTIBIOTICS AT JAKOB.:                      RECEIVED :  03/15/22 12:15   CBC WITH AUTO DIFFERENTIAL   PROTIME-INR   TYPE AND SCREEN       All other labs were within normal range or not returned as of this dictation. EMERGENCY DEPARTMENT COURSE and DIFFERENTIAL DIAGNOSIS/MDM:   Vitals:    Vitals:    03/15/22 1200 03/15/22 1215 03/15/22 1509   BP: 122/88 (!) 123/94 (!) 140/98   Pulse: 133 134 116   Resp: 12 23 20   Temp: 98.3 °F (36.8 °C)     TempSrc: Oral     SpO2: 97% 98% 98%       This patient presents from his primary care physician's office with a history of bright red blood per rectum this morning, a low blood pressure in the office with a rapid heart rate. He says he has been feeling somewhat more fatigued lately and a little more short of breath with activity than usual.  He attributed to age. The episode of bleeding per rectum happened only once this morning. He felt like he was constipated. He finally had a bowel movement. He noticed some blood when he wiped and some blood in the toilet. He is not having any abdominal pain. He had some minimal tinge mucus on rectal but no gross red blood, no melena, no stool in the rectal vault. No obvious external cause for the bleeding. His H&H is stable at 15.2 and 45.5. His white blood cell count 6700 with 68 neutrophils and 20 lymphs. His renal function is normal.  His BUN and creatinine are normal.  His PT and INR normal.  His CT abdomen pelvis shows no evidence of active GI bleeding.     His initial EKG showed a wide-complex tachycardia that appeared regular. The fluid bolus was ordered. Assuming that he had rectal bleeding I was concerned that his hemoglobin might be low as the reason for his tachycardia and it would respond to volume loading. His heart rate did not change. Repeat EKG you showed that his rhythm was actually atrial fibrillation with RVR. His hemoglobin was normal.  His renal function was normal.  He was therefore bolused with Cardizem and placed on a Cardizem drip. His heart rates improving. It is trending around 110. I contacted his primary care physician. Dr. Jesusita Boles will admit. Test results, diagnosis, and treatment plan were discussed with the patient and his wife. They understand the treatment plan and follow-up as discussed. The patient did asked me to call his son who is a physician in [facility] to relay this information to him as well. I have left a message on his phone asking him to call me back. 1550:  Dr. Shruti Sterling, patients son called back. Discussed with him per patients request.    CONSULTS:  IP CONSULT TO INTERNAL MEDICINE    PROCEDURES:  None    FINAL IMPRESSION      1. Atrial fibrillation with rapid ventricular response ([address]    2. BRBPR (bright red blood per rectum)          DISPOSITION/PLAN   DISPOSITION Decision To Admit 03/15/2022 02:19:52 PM      PATIENT REFERRED TO:  No follow-up provider specified.     DISCHARGE MEDICATIONS:  New Prescriptions    No medications on file       (Please note that portions of this note were completed with a voice recognition program.  Efforts were made to edit the dictations but occasionally words are mis-transcribed.)    Johnathon So MD  Attending Emergency Physician        Jessi Briscoe MD  03/15/22 Jn Neely MD  03/15/22 [phone]

## 2022-03-15 NOTE — ED TRIAGE NOTES
Patient to ED via squad from doctors office. Patient states he had rectal bleeding, onset today with bowel movement. bright red blood. reports dizziness and shortness of breath.

## 2022-03-16 LAB
ANION GAP SERPL CALCULATED.3IONS-SCNC: 12 MMOL/L (ref 3–16)
APTT: 29.8 SEC (ref 26.2–38.6)
APTT: 35.7 SEC (ref 26.2–38.6)
BUN BLDV-MCNC: 11 MG/DL (ref 7–20)
CALCIUM SERPL-MCNC: 8.5 MG/DL (ref 8.3–10.6)
CHLORIDE BLD-SCNC: 104 MMOL/L (ref 99–110)
CO2: 23 MMOL/L (ref 21–32)
CREAT SERPL-MCNC: 0.5 MG/DL (ref 0.8–1.3)
GFR AFRICAN AMERICAN: >60
GFR NON-AFRICAN AMERICAN: >60
GLUCOSE BLD-MCNC: 132 MG/DL (ref 70–99)
GLUCOSE BLD-MCNC: 149 MG/DL (ref 70–99)
GLUCOSE BLD-MCNC: 160 MG/DL (ref 70–99)
GLUCOSE BLD-MCNC: 182 MG/DL (ref 70–99)
HCT VFR BLD CALC: 40.2 % (ref 40.5–52.5)
HCT VFR BLD CALC: 41.1 % (ref 40.5–52.5)
HCT VFR BLD CALC: 41.7 % (ref 40.5–52.5)
HCT VFR BLD CALC: 41.9 % (ref 40.5–52.5)
HEMOGLOBIN: 13.5 G/DL (ref 13.5–17.5)
HEMOGLOBIN: 13.5 G/DL (ref 13.5–17.5)
HEMOGLOBIN: 13.8 G/DL (ref 13.5–17.5)
HEMOGLOBIN: 14 G/DL (ref 13.5–17.5)
MCH RBC QN AUTO: 30.4 PG (ref 26–34)
MCHC RBC AUTO-ENTMCNC: 33.2 G/DL (ref 31–36)
MCV RBC AUTO: 91.7 FL (ref 80–100)
PDW BLD-RTO: 14 % (ref 12.4–15.4)
PERFORMED ON: ABNORMAL
PLATELET # BLD: 173 K/UL (ref 135–450)
PMV BLD AUTO: 9.2 FL (ref 5–10.5)
POTASSIUM SERPL-SCNC: 3.8 MMOL/L (ref 3.5–5.1)
RBC # BLD: 4.55 M/UL (ref 4.2–5.9)
SARS-COV-2, NAAT: NOT DETECTED
SLIDE REVIEW: NORMAL
SODIUM BLD-SCNC: 139 MMOL/L (ref 136–145)
WBC # BLD: 6 K/UL (ref 4–11)

## 2022-03-16 PROCEDURE — 6370000000 HC RX 637 (ALT 250 FOR IP): Performed by: INTERNAL MEDICINE

## 2022-03-16 PROCEDURE — 2580000003 HC RX 258: Performed by: INTERNAL MEDICINE

## 2022-03-16 PROCEDURE — 85027 COMPLETE CBC AUTOMATED: CPT

## 2022-03-16 PROCEDURE — 85014 HEMATOCRIT: CPT

## 2022-03-16 PROCEDURE — 80048 BASIC METABOLIC PNL TOTAL CA: CPT

## 2022-03-16 PROCEDURE — 85018 HEMOGLOBIN: CPT

## 2022-03-16 PROCEDURE — 87635 SARS-COV-2 COVID-19 AMP PRB: CPT

## 2022-03-16 PROCEDURE — 85730 THROMBOPLASTIN TIME PARTIAL: CPT

## 2022-03-16 PROCEDURE — 36415 COLL VENOUS BLD VENIPUNCTURE: CPT

## 2022-03-16 PROCEDURE — 2060000000 HC ICU INTERMEDIATE R&B

## 2022-03-16 PROCEDURE — 6370000000 HC RX 637 (ALT 250 FOR IP): Performed by: NURSE PRACTITIONER

## 2022-03-16 PROCEDURE — 6360000002 HC RX W HCPCS: Performed by: INTERNAL MEDICINE

## 2022-03-16 PROCEDURE — 93306 TTE W/DOPPLER COMPLETE: CPT

## 2022-03-16 PROCEDURE — 99222 1ST HOSP IP/OBS MODERATE 55: CPT | Performed by: NURSE PRACTITIONER

## 2022-03-16 PROCEDURE — 99223 1ST HOSP IP/OBS HIGH 75: CPT | Performed by: INTERNAL MEDICINE

## 2022-03-16 RX ORDER — DILTIAZEM HYDROCHLORIDE 60 MG/1
60 TABLET, FILM COATED ORAL EVERY 6 HOURS SCHEDULED
Status: DISCONTINUED | OUTPATIENT
Start: 2022-03-16 | End: 2022-03-17

## 2022-03-16 RX ORDER — HEPARIN SODIUM 10000 [USP'U]/100ML
0-3000 INJECTION, SOLUTION INTRAVENOUS CONTINUOUS
Status: DISCONTINUED | OUTPATIENT
Start: 2022-03-16 | End: 2022-03-17

## 2022-03-16 RX ORDER — DEXTROSE MONOHYDRATE 25 G/50ML
12.5 INJECTION, SOLUTION INTRAVENOUS PRN
Status: DISCONTINUED | OUTPATIENT
Start: 2022-03-16 | End: 2022-03-17 | Stop reason: HOSPADM

## 2022-03-16 RX ORDER — DEXTROSE MONOHYDRATE 50 MG/ML
100 INJECTION, SOLUTION INTRAVENOUS PRN
Status: DISCONTINUED | OUTPATIENT
Start: 2022-03-16 | End: 2022-03-17 | Stop reason: HOSPADM

## 2022-03-16 RX ORDER — NICOTINE POLACRILEX 4 MG
15 LOZENGE BUCCAL PRN
Status: DISCONTINUED | OUTPATIENT
Start: 2022-03-16 | End: 2022-03-17 | Stop reason: HOSPADM

## 2022-03-16 RX ADMIN — Medication 1000 UNITS/HR: at 10:26

## 2022-03-16 RX ADMIN — DILTIAZEM HYDROCHLORIDE 60 MG: 60 TABLET, FILM COATED ORAL at 18:03

## 2022-03-16 RX ADMIN — Medication 1430 UNITS/HR: at 17:22

## 2022-03-16 RX ADMIN — INSULIN LISPRO 1 UNITS: 100 INJECTION, SOLUTION INTRAVENOUS; SUBCUTANEOUS at 20:33

## 2022-03-16 RX ADMIN — SODIUM CHLORIDE, PRESERVATIVE FREE 10 ML: 5 INJECTION INTRAVENOUS at 20:28

## 2022-03-16 RX ADMIN — SODIUM CHLORIDE, PRESERVATIVE FREE 10 ML: 5 INJECTION INTRAVENOUS at 09:00

## 2022-03-16 RX ADMIN — INSULIN LISPRO 1 UNITS: 100 INJECTION, SOLUTION INTRAVENOUS; SUBCUTANEOUS at 12:35

## 2022-03-16 RX ADMIN — TAMSULOSIN HYDROCHLORIDE 0.4 MG: 0.4 CAPSULE ORAL at 18:03

## 2022-03-16 RX ADMIN — PANTOPRAZOLE SODIUM 40 MG: 40 TABLET, DELAYED RELEASE ORAL at 05:06

## 2022-03-16 RX ADMIN — PRAVASTATIN SODIUM 40 MG: 40 TABLET ORAL at 10:27

## 2022-03-16 RX ADMIN — SODIUM CHLORIDE: 9 INJECTION, SOLUTION INTRAVENOUS at 05:02

## 2022-03-16 RX ADMIN — DILTIAZEM HYDROCHLORIDE 60 MG: 60 TABLET, FILM COATED ORAL at 11:53

## 2022-03-16 ASSESSMENT — PAIN SCALES - GENERAL
PAINLEVEL_OUTOF10: 0

## 2022-03-16 NOTE — ACP (ADVANCE CARE PLANNING)
Advance Care Planning     Advance Care Planning Activator (Inpatient)  Conversation Note      Date of ACP Conversation: 3/16/2022     Conversation Conducted with: Patient with Decision Making Capacity    ACP Activator: James Nieves RN    Health Care Decision Maker:     Current Designated Health Care Decision Maker:     Primary Decision Maker: Sallie Bautista - Child - [phone]    Secondary Decision Maker: Sandra Puckett Child - [phone]    Today we documented Decision Maker(s) consistent with Legal Next of Kin hierarchy. Care Preferences    Ventilation: \"If you were in your present state of health and suddenly became very ill and were unable to breathe on your own, what would your preference be about the use of a ventilator (breathing machine) if it were available to you? \"      Would the patient desire the use of ventilator (breathing machine)?: yes    \"If your health worsens and it becomes clear that your chance of recovery is unlikely, what would your preference be about the use of a ventilator (breathing machine) if it were available to you? \"     Would the patient desire the use of ventilator (breathing machine)?: No      Resuscitation  \"CPR works best to restart the heart when there is a sudden event, like a heart attack, in someone who is otherwise healthy. Unfortunately, CPR does not typically restart the heart for people who have serious health conditions or who are very sick. \"    \"In the event your heart stopped as a result of an underlying serious health condition, would you want attempts to be made to restart your heart (answer \"yes\" for attempt to resuscitate) or would you prefer a natural death (answer \"no\" for do not attempt to resuscitate)? \" yes       [] Yes   [x] No   Educated Patient / Malathi Ohara regarding differences between Advance Directives and portable DNR orders.     Length of ACP Conversation in minutes:  5 minutes    Conversation Outcomes:  [x] ACP discussion completed  [] Existing advance directive reviewed with patient; no changes to patient's previously recorded wishes  [] New Advance Directive completed  [] Portable Do Not Rescitate prepared for Provider review and signature  [] POLST/POST/MOLST/MOST prepared for Provider review and signature      Follow-up plan:    [] Schedule follow-up conversation to continue planning  [] Referred individual to Provider for additional questions/concerns   [] Advised patient/agent/surrogate to review completed ACP document and update if needed with changes in condition, patient preferences or care setting    [x] This note routed to one or more involved healthcare providers  Electronically signed by Gómez Smith RN Case Management [phone] on 3/16/2022 at 11:35 AM

## 2022-03-16 NOTE — CONSULTS
Cardiac Electrophysiology Consultation     Date: 3/16/2022  Admit Date:  3/15/2022  Admission Diagnosis: BRBPR (bright red blood per rectum) [K62.5]  Atrial fibrillation with rapid ventricular response (HCC) [I48.91]  Atrial fibrillation with RVR ([facility] [address] [I48.91]     Reason for Consultation: new atrial fibrillation and flutter  Consult Requesting Physician: Gurvinder Gaines, *       History of Present Illness  Jamshid Johnson is a 64y.o. year old male with past medical history significant for HTN, HLD, DM, nonobstructive CAD on [facility] in 2012 and TASHIA (not on Bipap since gastric sleeve) who presented to the ED from his PCP's office due to elevated HR and hypotension. He also had bright red blood per rectum while at home yesterday morning after he tried to stimulate his rectum to have a bowel movement. He then noted bright red blood with a possible clot. He had a routine appointment with his PCP but EMS was called due to tachycardia and hypotension. While in the ED, he was noted to be in atrial flutter with v-rates in the 130s. He was given a bolus of IV diltiazem and started on a drip with improvement in his rates so the drip has since be stopped. This morning, he is in atrial flutter, v-rates 100s-130s at rest. Denies any palpitations, dyspnea or recently increased fatigue. No chest pain. Does admit to LUQ pain for the past 2 years which has been worked up by GI. No syncope or near syncope. No further bleeding since being admitted to the hospital and H&H has been WNL.       Past Medical History:   Diagnosis Date    Arthritis     Hyperlipidemia     Hypertension     [facility]    Focal Prostate Cancer found on pathology report at the time of TURP    Sleep apnea     no BiPap since gastric surgery    Spinal stenosis     Type II or unspecified type diabetes mellitus without mention of complication, not stated as uncontrolled     diet controlled since gastric surgery        Past Surgical use drugs. Family History:  family history includes Breast Cancer in his sister; Coronary Art Dis in his father and mother; Stann Gibson in his mother; Stroke in his mother. Review of Systems:  · General: negative for fever, chills   · Ophthalmic ROS: negative for eye pain or loss of vision  · ENT ROS: negative for headaches, sore throat, nasal drainage  · Respiratory: negative for cough, sputum, SOB  · Cardiovascular: negative for chest pain, palpitations. · Gastrointestinal: negative for abdominal pain, diarrhea, N/V  · Hematology: positive for rectal bleeding, no blood clots, bruising or jaundice  · Genito-Urinary:  negative for dysuria or incontinence  · Musculoskeletal: negative for joint swelling, muscle pain  · Neurological: negative for confusion, dizziness, headaches   · Psychiatric: negative anxiety, depression  · Dermatological: negative for rash    Medications:  Scheduled Meds:   dapagliflozin  5 mg Oral QAM    pantoprazole  40 mg Oral QAM AC    pravastatin  40 mg Oral Daily    tamsulosin  0.4 mg Oral QPM    sodium chloride flush  5-40 mL IntraVENous 2 times per day      Continuous Infusions:   sodium chloride      dilTIAZem Stopped (03/15/22 2135)    sodium chloride 100 mL/hr at 22 0502     PRN Meds:.sodium chloride flush, sodium chloride, ondansetron **OR** ondansetron, polyethylene glycol, acetaminophen **OR** acetaminophen, perflutren lipid microspheres     Physical Examination:  Vitals:    22 0749   BP: (!) 146/98   Pulse: 118   Resp: 21   Temp: 97.8 °F (36.6 °C)   SpO2: 97%        Intake/Output Summary (Last 24 hours) at 3/16/2022 0848  Last data filed at 3/15/2022 2303  Gross per 24 hour   Intake 740 ml   Output    Net 740 ml     In: 740 [P.O.:240;  I.V.:500]  Out: -    Wt Readings from Last 3 Encounters:   22 237 lb 3.4 oz (107.6 kg)   03/15/22 240 lb 12.8 oz (109.2 kg)   21 246 lb 9.6 oz (111.9 kg)     Temp  Av.1 °F (36.7 °C)  Min: 97.6 °F (36.4 °C) Max: 98.3 °F (36.8 °C)  Pulse  Av.7  Min: 56  Max: 140  BP  Min: 73/41  Max: 162/67  SpO2  Av.1 %  Min: 97 %  Max: 99 %    · Telemetry: Atrial flutter v-rates 130s. · Constitutional: Alert, in no acute distress. Appears stated age. · Head: Normocephalic and atraumatic. · Eyes: Conjunctivae normal. EOM are normal.   · Neck: Neck supple. No lymphadenopathy. No rigidity. No JVD present. · Cardiovascular: Fast rate, regular rhythm. No murmurs, rubs or gallops. No S3 or S4.  · Pulmonary/Chest: Clear breath sounds bilaterally. No crackles, wheezes or rhonchi. No respiratory accessory muscle use. · Abdominal: Soft. Normal bowel sounds present. No distension, No tenderness. · Musculoskeletal: No tenderness. No edema    · Lymphadenopathy: Has no cervical adenopathy. · Neurological: Alert and oriented. No gross deficits. · Skin: Skin is warm and dry. No rash, lesions, ulcerations noted. · Psychiatric: No anxiety nor agitation. Labs:  Reviewed. Recent Labs     03/15/22  1205 22  0413    139   K 4.1 3.8    104   CO2 25 23   BUN 14 11   CREATININE 0.5* 0.5*     Recent Labs     03/15/22  1205 22  0001 22  0731   WBC 6.7  --   --    HGB 15.2 13.5 14.0   HCT 45.5 41.1 41.9   MCV 91.2  --   --      --   --      No results found for: CKTOTAL, CKMB, CKMBINDEX, TROPONINI  No results found for: BNP  Lab Results   Component Value Date    PROTIME 12.2 03/15/2022    PROTIME 11.1 2018    INR 1.08 03/15/2022    INR 0.98 2018     Lab Results   Component Value Date    CHOL 151 2021    HDL 60 2021    HDL 36 2012    TRIG 136 2021       Diagnostic and imaging results reviewed. ECG: 3/15/22  Atrial fibrillation versus flutter at 125 BPM. RBBB. Inferior T wave inversion. Echo: 14  EF 60-65%. Grade I diastolic dysfunction. Mild to moderate concentric LVH. Left atrium is mildly dilated.      Stress echo: 20       o Negative ECG for ischemia with graded exercise test.      o Negative stress echo with no indication of inducible ischemia.      o Duke Treadmill Score is 5 which indicates low risk.      o Stress echo findings indicate low risk for future ischemic event . Cath: 4/27/12  Findings/Summary:      CORONARY ANGIOGRAPHY FINDINGS       DOMINANCE:  Left dominant           LEFT MAIN: Normal                         LEFT ANTERIOR DESCENDING:  Luminal irregularities 30% mid lesion       D3: 30% mid lesion               LEFT CIRCUMFLEX: Luminal irregularities      OM1: 30% proximal     lesion                   RIGHT CORONARY:  Luminal irregularities and Non-dominant                 LEFT VENTRICULAR FUNCTION:           LVEF: 50%           LVEDP: Normal pre-angio           LV Systolic Pressure: Normal           LV to AO Gradient: none           LV Wall Motion:  Normal,      .           MITRAL VALVE: No mitral insufficiency       Assessment & Plan:    New onset atrial flutter with RVR   - mostly atrial flutter seen on monitor but possibly some periods atrial fibrillation as well   - unclear duration as pt is unable to recall any profound new symptoms recently   - TSH and electrolytes WNL   - v-rates currently 100s-130s, now off Cardizem drip   - will add PO diltiazem (short-acting) with hold parameters   - CHADS2-VASc 5 (age, HTN, DM, CAD) and would recommend anticoagulation, the recent rectal bleeding does complicate this some. Discussed with Dr. Yifan Ochoa, will start heparin drip if okay with GI and see how he does over the next 24 hours, if no bleeding, would transition to Eliquis 5mg BID   - get echo   - discussed treatment options with pt including rate versus rhythm control, medical therapy, cardioversion and ablation. If pt doesn't convert over the next 24 hours, would perform SABRINA/cardioversion and then 30 day monitor at d/c.  If bleeding still a concern with anticoagulation, would recommend A flutter ablation to hopefully be able to stop anticoagulation after at least 4 weeks    Rectal bleeding   - does not seem to be actively bleeding, H&H has been WNL on serial checks   - issues with constipation may have been worsened by verapamil   - care per GI    CAD   - nonobstructive disease on [facility] in 2012   - denies angina   - on statin    Essential HTN   - issues with hypotension overnight so usual home meds on hold   - adding diltiazem and would resume lisinopril if/when appropriate    Discussed with Dr. Nadiya Sherwood.     RHETT Barney  [address]  Phone: [phone]  Fax: [phone]    Electronically signed by RHETT Johnson - CNP on 3/16/2022 at 8:48 AM

## 2022-03-16 NOTE — PROGRESS NOTES
[facility]    No further bleeding  Case discussed with Dr. Gibson Points  OK for anticoagulation; he has been started on IV heparin today  Cardiology to see  The risk of further bleeding is minimal  He should be OK for oral anticoagulation as well  Again, no further GI evaluation planned

## 2022-03-16 NOTE — H&P
[facility]  History and Physical      CHIEF COMPLAINT:  My heart is racing    History of Present Illness: This is a 58-year-old white male with a history of diabetes and hypertension, and remote history of prostate cancer, who presented to the outpatient office yesterday for routine follow-up of his diabetes. At the visit the patient stated he was not feeling well and reported an episode of bright red blood per rectum yesterday morning after having a bowel movement. Patient stated he had some difficulty getting started with his bowel movement and had to manually stimulate a bowel movement, then subsequently developed rectal bleeding. He did not have any additional bleeding but came to the outpatient office stating he was not feeling well and was lightheaded. His blood pressure was found to be 90/60 approximately and his heart rate was 140. Initially in the office the heart rate was felt to be very regular. The patient was sent to the emergency room by way of 911 for evaluation given the suspected GI bleed. In the emergency room his EKG revealed that he was in atrial fibrillation with rapid ventricular response. Cardizem drip was started and he was admitted for further evaluation and treatment. Anticoagulation was not instituted obviously and GI and cardiology were consulted. This morning the patient has had no more rectal bleeding, he has not had any additional bowel movement. Currently his Cardizem drip is turned off as he did have some hypotension overnight. Heart rate currently between 107130. Patient is entirely asymptomatic. He denies palpitations. Patient does report some shortness of breath with activity that has been fairly longstanding. He has no prior history of atrial fibrillation. He denies chest discomfort. Patient denies cough or sputum. No abdominal pain. No nausea or vomiting or diarrhea. He denies dysuria.         Past Medical History:   Diagnosis Date    Arthritis     Hyperlipidemia     Hypertension     Prostate cancer ([facility] [address] 12/2014    Focal Prostate Cancer found on pathology report at the time of TURP    Sleep apnea     no BiPap since gastric surgery    Spinal stenosis     Type II or unspecified type diabetes mellitus without mention of complication, not stated as uncontrolled     diet controlled since gastric surgery         Past Surgical History:   Procedure Laterality Date    CHOLECYSTECTOMY  1980's    COLONOSCOPY  07/24/2014    Dr Nica Syed, polyp, repeat 3 years    COLONOSCOPY  08/15/2017    Dr Richardson Jorge Luis- sigmoid diverticulosis, small adenomatous polyp, repeat in 5 years    COLONOSCOPY  12/27/2021    Manegold- sigmoid diverticulosis, repeat in 5 years    FOOT SURGERY  1980's    Left bone fused    JOINT REPLACEMENT      THR    PROSTATE SURGERY  2004 approx    TURP ([facility])    ROTATOR CUFF REPAIR Bilateral     left 6/2016.   left x 2, right x 1    IDANIA-EN-Y GASTRIC BYPASS  12/2012    Ok Myles SHOULDER SURGERY Bilateral     rotator cuff    TOTAL HIP ARTHROPLASTY  12/2007    Right    TURP  12/09/2014    cysto TURP, Focal Prostate Cancer on Pathology       Medications Prior to Admission:    Medications Prior to Admission: metFORMIN (GLUCOPHAGE) 1000 MG tablet, TAKE 1 TABLET TWICE A DAY WITH MEALS  dapagliflozin (FARXIGA) 5 MG tablet, Take 1 tablet by mouth every morning  pravastatin (PRAVACHOL) 40 MG tablet, TAKE 1 TABLET DAILY  tamsulosin (FLOMAX) 0.4 MG capsule, TAKE 1 CAPSULE EVERY EVENING  verapamil (CALAN SR) 180 MG extended release tablet, TAKE 1 TABLET DAILY  lisinopril (PRINIVIL;ZESTRIL) 20 MG tablet, TAKE 1 TABLET DAILY  omeprazole (PRILOSEC) 40 MG delayed release capsule, TAKE 1 CAPSULE DAILY  aspirin 81 MG tablet, Take 81 mg by mouth daily  Cyanocobalamin (VITAMIN B-12) 1000 MCG SUBL, Place 1 tablet under the tongue once a week   vitamin D (CHOLECALCIFEROL) 1000 UNIT TABS tablet, Take 1,000 Units by mouth daily  CALCIUM CITRATE PO, Take 1,000 mcg by mouth 4 times daily   Multiple Vitamin (MULTI-VITAMIN PO), Take 1 tablet by mouth 2 times daily. Allergies:    Horse protein    Social History:    reports that he quit smoking about 42 years ago. His smoking use included cigarettes. He started smoking about 57 years ago. He has a 1.75 pack-year smoking history. He has never used smokeless tobacco. He reports current alcohol use. He reports that he does not use drugs. Family History:   family history includes Breast Cancer in his daughter and sister; Coronary Art Dis in his father and mother; Shy Sarah in his mother; Stroke in his mother. REVIEW OF SYSTEMS:  As above in the HPI, otherwise negative    PHYSICAL EXAM:    Vitals:  BP (!) 146/98   Pulse 118   Temp 97.8 °F (36.6 °C) (Oral)   Resp 21   Ht 6' (1.829 m)   Wt 237 lb 3.4 oz (107.6 kg)   SpO2 97%   BMI 32.17 kg/m²   Temp  Av.1 °F (36.7 °C)  Min: 97.6 °F (36.4 °C)  Max: 98.3 °F (36.8 °C)    General:  Awake, alert, oriented X 3. Well developed, well nourished. No apparent distress. HEENT:  Normocephalic, atraumatic. Pupils equal, round, reactive to light. No scleral icterus. No conjunctival injection. Normal lips, teeth, and gums. No nasal discharge. Neck:  Supple. No carotid bruit, carotid upstroke normal.  Heart: Irregularly irregular, no murmurs, gallops, or rubs. PMI non-displaced  Lungs:  CTA bilaterally, bilat symmetrical expansion, no wheeze, rales, or rhonchi. Respirations easy. Abdomen: Bowel sounds present, normoactive. Soft, nontender/nondistended. No masses, no peritoneal signs. Extremities:  No clubbing, cyanosis, or edema  Skin:  Warm and dry, no open lesions or rash. Neuro:  Cranial nerves 2-12 intact, no focal deficits. Moves all extremities without difficulty.     LABS/Diagnostics reviewed:    Recent Results (from the past 24 hour(s))   CBC with Auto Differential    Collection Time: 03/15/22 12:05 PM   Result Value Ref Range Duration 140 ms    Q-T Interval 346 ms    QTc Calculation (Bazett) 514 ms    R Axis 104 degrees    T Axis -60 degrees    Diagnosis       Sinus tachycardiaRight bundle branch blockT wave abnormality, consider inferior ischemiaAbnormal ECGNo previous ECGs availableConfirmed by GARCIA DEWITT MD ([id number]) on 3/15/2022 1:12:20 PM   EKG 12 Lead    Collection Time: 03/15/22  2:10 PM   Result Value Ref Range    Ventricular Rate 125 BPM    Atrial Rate 136 BPM    QRS Duration 138 ms    Q-T Interval 362 ms    QTc Calculation (Bazett) 522 ms    R Axis 102 degrees    T Axis -56 degrees    Diagnosis       Atrial fibrillation with rapid ventricular responseRight bundle branch blockT wave abnormality, consider inferior ischemiaAbnormal ECGWhen compared with ECG of 15-MAR-2022 12:24,Atrial fibrillation has replaced Sinus rhythm   POCT Glucose    Collection Time: 03/15/22  6:10 PM   Result Value Ref Range    POC Glucose 161 (H) 70 - 99 mg/dl    Performed on ACCU-CHEK    Hemoglobin and Hematocrit    Collection Time: 03/16/22 12:01 AM   Result Value Ref Range    Hemoglobin 13.5 13.5 - 17.5 g/dL    Hematocrit 41.1 40.5 - 52.5 %   Basic Metabolic Panel    Collection Time: 03/16/22  4:13 AM   Result Value Ref Range    Sodium 139 136 - 145 mmol/L    Potassium 3.8 3.5 - 5.1 mmol/L    Chloride 104 99 - 110 mmol/L    CO2 23 21 - 32 mmol/L    Anion Gap 12 3 - 16    Glucose 149 (H) 70 - 99 mg/dL    BUN 11 7 - 20 mg/dL    CREATININE 0.5 (L) 0.8 - 1.3 mg/dL    GFR Non-African American >60 >60    GFR African American >60 >60    Calcium 8.5 8.3 - 10.6 mg/dL   Hemoglobin and Hematocrit    Collection Time: 03/16/22  7:31 AM   Result Value Ref Range    Hemoglobin 14.0 13.5 - 17.5 g/dL    Hematocrit 41.9 40.5 - 52.5 %       ASSESSMENT:      Principal Problem:    Atrial fibrillation with RVR (HCC)  Active Problems:    Bright red rectal bleeding    Essential hypertension    Type 2 diabetes mellitus without complication, without long-term current use of

## 2022-03-16 NOTE — CARE COORDINATION
INITIAL CASE MANAGEMENT ASSESSMENT    Reviewed chart, met with patient to assess possible discharge needs. Explained Case Management role/services. 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 Denies home needs. SW/CM provided contact information for patient or family to call with any questions. SW/CM will follow and assist as needed.   Electronically signed by Francois Dash RN Case Management [phone] on 3/16/2022 at 11:28 AM

## 2022-03-16 NOTE — PROGRESS NOTES
Clinical Pharmacy Note  Heparin Dosing       Lab Results   Component Value Date    APTT 35.7 03/16/2022     Lab Results   Component Value Date    HGB 13.8 03/16/2022    HCT 41.7 03/16/2022     03/16/2022    INR 1.08 03/15/2022       Current Infusion Rate: 1000 units/hr    Plan:  Bolus: NONE  Rate: Increase to 1430 units/hr  Next aPTT:  2330 3-16-22    Pharmacy will continue to monitor and adjust based on aPTT results.     [address] [facility], [facility], [place] 3/16/2022  5:14 PM

## 2022-03-16 NOTE — PROGRESS NOTES
Clinical Pharmacy Note  Heparin Dosing Consult    Aguila Michael is a 64 y.o. male ordered heparin per low dose nomogram (no bolus) by Dr. Jesusita Boles. Lab Results   Component Value Date    APTT 28.0 02/14/2018     Lab Results   Component Value Date    HGB 14.0 03/16/2022    HCT 41.9 03/16/2022     03/15/2022    INR 1.08 03/15/2022       Ht Readings from Last 1 Encounters:   03/15/22 6' (1.829 m)        Wt Readings from Last 1 Encounters:   03/16/22 237 lb 3.4 oz (107.6 kg)         Assessment/Plan:  Initial infusion rate: 1000 units/hr  Next aPTT: 1600    Pharmacy will continue to monitor adjust heparin based on aPTT results using nomogram below:     LOW DOSE HEPARIN PROTOCOL (ACS/STEMI/A FIB)     Initial Rate: 12 units/kg/hr Max Initial Rate: 1,000 units/hr     aPTT < 45   No bolus   Increase infusion by 4 units/kg/hr   aPTT 45-59.9   No bolus   Increase infusion by 2 units/kg/hr   aPTT 60-90   No bolus   No change   aPTT 90.1-97.5  No bolus   Decrease infusion by 1 units/kg/hr   aPTT 97.6-105   No bolus   Decrease infusion by 2 units/kg/hr   aPTT > 105     Hold heparin for 1 hour Decrease infusion by 3 units/kg/hr     Obtain aPTT 6 hours after initial bolus and 6 hours after any dose change until two consecutive therapeutic aPTTs are achieved - then daily.   ZONIA French [facility], 3/16/2022 9:49 AM

## 2022-03-16 NOTE — PROGRESS NOTES
Case discussed with GI, Dr. Anabel German. Okay to start heparin drip and monitor for additional bleeding.   Electronically signed by Lawrence Chao MD on 3/16/2022 at 9:31 AM

## 2022-03-17 ENCOUNTER — ANESTHESIA EVENT (OUTPATIENT)
Dept: CARDIAC CATH/INVASIVE PROCEDURES | Age: 77
DRG: 274 | End: 2022-03-17
Payer: MEDICARE

## 2022-03-17 ENCOUNTER — HOSPITAL ENCOUNTER (INPATIENT)
Dept: CARDIAC CATH/INVASIVE PROCEDURES | Age: 77
Discharge: HOME | DRG: 274 | End: 2022-03-17
Payer: MEDICARE

## 2022-03-17 ENCOUNTER — ANESTHESIA (OUTPATIENT)
Dept: CARDIAC CATH/INVASIVE PROCEDURES | Age: 77
DRG: 274 | End: 2022-03-17
Payer: MEDICARE

## 2022-03-17 VITALS
WEIGHT: 236.99 LBS | DIASTOLIC BLOOD PRESSURE: 96 MMHG | HEART RATE: 107 BPM | RESPIRATION RATE: 11 BRPM | SYSTOLIC BLOOD PRESSURE: 149 MMHG | TEMPERATURE: 97.8 F | HEIGHT: 72 IN | OXYGEN SATURATION: 92 % | BODY MASS INDEX: 32.1 KG/M2

## 2022-03-17 VITALS
TEMPERATURE: 96.8 F | DIASTOLIC BLOOD PRESSURE: 75 MMHG | SYSTOLIC BLOOD PRESSURE: 116 MMHG | OXYGEN SATURATION: 91 % | RESPIRATION RATE: 2 BRPM

## 2022-03-17 VITALS
RESPIRATION RATE: 18 BRPM | SYSTOLIC BLOOD PRESSURE: 147 MMHG | DIASTOLIC BLOOD PRESSURE: 97 MMHG | TEMPERATURE: 97.8 F | HEART RATE: 98 BPM | OXYGEN SATURATION: 96 %

## 2022-03-17 LAB
APTT: 55.6 SEC (ref 26.2–38.6)
APTT: 69.6 SEC (ref 26.2–38.6)
EKG ATRIAL RATE: 136 BPM
EKG ATRIAL RATE: 96 BPM
EKG DIAGNOSIS: NORMAL
EKG DIAGNOSIS: NORMAL
EKG P AXIS: 56 DEGREES
EKG P-R INTERVAL: 160 MS
EKG Q-T INTERVAL: 362 MS
EKG Q-T INTERVAL: 412 MS
EKG QRS DURATION: 138 MS
EKG QRS DURATION: 148 MS
EKG QTC CALCULATION (BAZETT): 520 MS
EKG QTC CALCULATION (BAZETT): 522 MS
EKG R AXIS: 102 DEGREES
EKG R AXIS: 90 DEGREES
EKG T AXIS: -35 DEGREES
EKG T AXIS: -56 DEGREES
EKG VENTRICULAR RATE: 125 BPM
EKG VENTRICULAR RATE: 96 BPM
GLUCOSE BLD-MCNC: 171 MG/DL (ref 70–99)
HCT VFR BLD CALC: 41.9 % (ref 40.5–52.5)
HEMOGLOBIN: 14 G/DL (ref 13.5–17.5)
PERFORMED ON: ABNORMAL

## 2022-03-17 PROCEDURE — 93653 COMPRE EP EVAL TX SVT: CPT | Performed by: INTERNAL MEDICINE

## 2022-03-17 PROCEDURE — 85730 THROMBOPLASTIN TIME PARTIAL: CPT

## 2022-03-17 PROCEDURE — 02K83ZZ MAP CONDUCTION MECHANISM, PERCUTANEOUS APPROACH: ICD-10-PCS | Performed by: INTERNAL MEDICINE

## 2022-03-17 PROCEDURE — 93623 PRGRMD STIMJ&PACG IV RX NFS: CPT

## 2022-03-17 PROCEDURE — 3700000000 HC ANESTHESIA ATTENDED CARE

## 2022-03-17 PROCEDURE — 6360000002 HC RX W HCPCS

## 2022-03-17 PROCEDURE — 6370000000 HC RX 637 (ALT 250 FOR IP): Performed by: NURSE PRACTITIONER

## 2022-03-17 PROCEDURE — 2580000003 HC RX 258

## 2022-03-17 PROCEDURE — 93005 ELECTROCARDIOGRAM TRACING: CPT | Performed by: INTERNAL MEDICINE

## 2022-03-17 PROCEDURE — C1732 CATH, EP, DIAG/ABL, 3D/VECT: HCPCS

## 2022-03-17 PROCEDURE — C1894 INTRO/SHEATH, NON-LASER: HCPCS

## 2022-03-17 PROCEDURE — 2500000003 HC RX 250 WO HCPCS

## 2022-03-17 PROCEDURE — 2580000003 HC RX 258: Performed by: NURSE ANESTHETIST, CERTIFIED REGISTERED

## 2022-03-17 PROCEDURE — 85018 HEMOGLOBIN: CPT

## 2022-03-17 PROCEDURE — 2500000003 HC RX 250 WO HCPCS: Performed by: NURSE ANESTHETIST, CERTIFIED REGISTERED

## 2022-03-17 PROCEDURE — 6370000000 HC RX 637 (ALT 250 FOR IP): Performed by: INTERNAL MEDICINE

## 2022-03-17 PROCEDURE — 2580000003 HC RX 258: Performed by: INTERNAL MEDICINE

## 2022-03-17 PROCEDURE — 99238 HOSP IP/OBS DSCHRG MGMT 30/<: CPT | Performed by: INTERNAL MEDICINE

## 2022-03-17 PROCEDURE — A4216 STERILE WATER/SALINE, 10 ML: HCPCS

## 2022-03-17 PROCEDURE — 93010 ELECTROCARDIOGRAM REPORT: CPT | Performed by: INTERNAL MEDICINE

## 2022-03-17 PROCEDURE — A4216 STERILE WATER/SALINE, 10 ML: HCPCS | Performed by: NURSE ANESTHETIST, CERTIFIED REGISTERED

## 2022-03-17 PROCEDURE — 93662 INTRACARDIAC ECG (ICE): CPT | Performed by: INTERNAL MEDICINE

## 2022-03-17 PROCEDURE — C1759 CATH, INTRA ECHOCARDIOGRAPHY: HCPCS

## 2022-03-17 PROCEDURE — 6360000002 HC RX W HCPCS: Performed by: NURSE ANESTHETIST, CERTIFIED REGISTERED

## 2022-03-17 PROCEDURE — 6360000002 HC RX W HCPCS: Performed by: INTERNAL MEDICINE

## 2022-03-17 PROCEDURE — 93662 INTRACARDIAC ECG (ICE): CPT

## 2022-03-17 PROCEDURE — 99233 SBSQ HOSP IP/OBS HIGH 50: CPT | Performed by: NURSE PRACTITIONER

## 2022-03-17 PROCEDURE — C1893 INTRO/SHEATH, FIXED,NON-PEEL: HCPCS

## 2022-03-17 PROCEDURE — 4A0234Z MEASUREMENT OF CARDIAC ELECTRICAL ACTIVITY, PERCUTANEOUS APPROACH: ICD-10-PCS | Performed by: INTERNAL MEDICINE

## 2022-03-17 PROCEDURE — 02583ZZ DESTRUCTION OF CONDUCTION MECHANISM, PERCUTANEOUS APPROACH: ICD-10-PCS | Performed by: INTERNAL MEDICINE

## 2022-03-17 PROCEDURE — 2709999900 HC NON-CHARGEABLE SUPPLY

## 2022-03-17 PROCEDURE — 93623 PRGRMD STIMJ&PACG IV RX NFS: CPT | Performed by: INTERNAL MEDICINE

## 2022-03-17 PROCEDURE — 93653 COMPRE EP EVAL TX SVT: CPT

## 2022-03-17 PROCEDURE — C1769 GUIDE WIRE: HCPCS

## 2022-03-17 PROCEDURE — 2580000003 HC RX 258: Performed by: NURSE PRACTITIONER

## 2022-03-17 PROCEDURE — B24BZZ4 ULTRASONOGRAPHY OF HEART WITH AORTA, TRANSESOPHAGEAL: ICD-10-PCS | Performed by: INTERNAL MEDICINE

## 2022-03-17 PROCEDURE — 4A023FZ MEASUREMENT OF CARDIAC RHYTHM, PERCUTANEOUS APPROACH: ICD-10-PCS | Performed by: INTERNAL MEDICINE

## 2022-03-17 PROCEDURE — 85014 HEMATOCRIT: CPT

## 2022-03-17 PROCEDURE — 3700000001 HC ADD 15 MINUTES (ANESTHESIA)

## 2022-03-17 PROCEDURE — 36415 COLL VENOUS BLD VENIPUNCTURE: CPT

## 2022-03-17 PROCEDURE — 2500000003 HC RX 250 WO HCPCS: Performed by: INTERNAL MEDICINE

## 2022-03-17 RX ORDER — SODIUM CHLORIDE 0.9 % (FLUSH) 0.9 %
5-40 SYRINGE (ML) INJECTION PRN
Status: CANCELLED | OUTPATIENT
Start: 2022-03-17

## 2022-03-17 RX ORDER — MIDAZOLAM HYDROCHLORIDE 1 MG/ML
INJECTION INTRAMUSCULAR; INTRAVENOUS PRN
Status: DISCONTINUED | OUTPATIENT
Start: 2022-03-17 | End: 2022-03-17 | Stop reason: SDUPTHER

## 2022-03-17 RX ORDER — ACETAMINOPHEN 325 MG/1
650 TABLET ORAL EVERY 4 HOURS PRN
Status: CANCELLED | OUTPATIENT
Start: 2022-03-17

## 2022-03-17 RX ORDER — METOPROLOL SUCCINATE 25 MG/1
25 TABLET, EXTENDED RELEASE ORAL DAILY
Status: DISCONTINUED | OUTPATIENT
Start: 2022-03-17 | End: 2022-03-17 | Stop reason: HOSPADM

## 2022-03-17 RX ORDER — SODIUM CHLORIDE 9 MG/ML
25 INJECTION, SOLUTION INTRAVENOUS PRN
Status: DISCONTINUED | OUTPATIENT
Start: 2022-03-17 | End: 2022-03-17 | Stop reason: HOSPADM

## 2022-03-17 RX ORDER — SODIUM CHLORIDE 0.9 % (FLUSH) 0.9 %
5-40 SYRINGE (ML) INJECTION EVERY 12 HOURS SCHEDULED
Status: CANCELLED | OUTPATIENT
Start: 2022-03-17

## 2022-03-17 RX ORDER — ACETAMINOPHEN 325 MG/1
650 TABLET ORAL EVERY 4 HOURS PRN
Status: DISCONTINUED | OUTPATIENT
Start: 2022-03-17 | End: 2022-03-17 | Stop reason: HOSPADM

## 2022-03-17 RX ORDER — VECURONIUM BROMIDE 1 MG/ML
INJECTION, POWDER, LYOPHILIZED, FOR SOLUTION INTRAVENOUS PRN
Status: DISCONTINUED | OUTPATIENT
Start: 2022-03-17 | End: 2022-03-17 | Stop reason: SDUPTHER

## 2022-03-17 RX ORDER — SODIUM CHLORIDE 9 MG/ML
INJECTION, SOLUTION INTRAVENOUS CONTINUOUS
Status: DISCONTINUED | OUTPATIENT
Start: 2022-03-17 | End: 2022-03-17 | Stop reason: HOSPADM

## 2022-03-17 RX ORDER — EPHEDRINE SULFATE/0.9% NACL/PF 50 MG/5 ML
SYRINGE (ML) INTRAVENOUS PRN
Status: DISCONTINUED | OUTPATIENT
Start: 2022-03-17 | End: 2022-03-17 | Stop reason: SDUPTHER

## 2022-03-17 RX ORDER — SODIUM CHLORIDE 0.9 % (FLUSH) 0.9 %
5-40 SYRINGE (ML) INJECTION EVERY 12 HOURS SCHEDULED
Status: DISCONTINUED | OUTPATIENT
Start: 2022-03-17 | End: 2022-03-17 | Stop reason: SDUPTHER

## 2022-03-17 RX ORDER — LIDOCAINE HYDROCHLORIDE 10 MG/ML
INJECTION, SOLUTION INFILTRATION; PERINEURAL PRN
Status: DISCONTINUED | OUTPATIENT
Start: 2022-03-17 | End: 2022-03-17 | Stop reason: SDUPTHER

## 2022-03-17 RX ORDER — KETAMINE HCL IN NACL, ISO-OSM 100MG/10ML
SYRINGE (ML) INJECTION PRN
Status: DISCONTINUED | OUTPATIENT
Start: 2022-03-17 | End: 2022-03-17 | Stop reason: SDUPTHER

## 2022-03-17 RX ORDER — PHENYLEPHRINE HCL IN 0.9% NACL 1 MG/10 ML
SYRINGE (ML) INTRAVENOUS PRN
Status: DISCONTINUED | OUTPATIENT
Start: 2022-03-17 | End: 2022-03-17 | Stop reason: SDUPTHER

## 2022-03-17 RX ORDER — SUCCINYLCHOLINE/SOD CL,ISO/PF 200MG/10ML
SYRINGE (ML) INTRAVENOUS PRN
Status: DISCONTINUED | OUTPATIENT
Start: 2022-03-17 | End: 2022-03-17 | Stop reason: SDUPTHER

## 2022-03-17 RX ORDER — ONDANSETRON 2 MG/ML
INJECTION INTRAMUSCULAR; INTRAVENOUS PRN
Status: DISCONTINUED | OUTPATIENT
Start: 2022-03-17 | End: 2022-03-17 | Stop reason: SDUPTHER

## 2022-03-17 RX ORDER — GLYCOPYRROLATE 0.2 MG/ML
INJECTION INTRAMUSCULAR; INTRAVENOUS PRN
Status: DISCONTINUED | OUTPATIENT
Start: 2022-03-17 | End: 2022-03-17 | Stop reason: SDUPTHER

## 2022-03-17 RX ORDER — FENTANYL CITRATE 50 UG/ML
25 INJECTION, SOLUTION INTRAMUSCULAR; INTRAVENOUS EVERY 5 MIN PRN
Status: CANCELLED | OUTPATIENT
Start: 2022-03-17

## 2022-03-17 RX ORDER — ONDANSETRON 2 MG/ML
4 INJECTION INTRAMUSCULAR; INTRAVENOUS
Status: CANCELLED | OUTPATIENT
Start: 2022-03-17 | End: 2022-03-17

## 2022-03-17 RX ORDER — SODIUM CHLORIDE 9 MG/ML
INJECTION INTRAVENOUS PRN
Status: DISCONTINUED | OUTPATIENT
Start: 2022-03-17 | End: 2022-03-17 | Stop reason: SDUPTHER

## 2022-03-17 RX ORDER — DEXAMETHASONE SODIUM PHOSPHATE 4 MG/ML
INJECTION, SOLUTION INTRA-ARTICULAR; INTRALESIONAL; INTRAMUSCULAR; INTRAVENOUS; SOFT TISSUE PRN
Status: DISCONTINUED | OUTPATIENT
Start: 2022-03-17 | End: 2022-03-17 | Stop reason: SDUPTHER

## 2022-03-17 RX ORDER — PROPOFOL 10 MG/ML
INJECTION, EMULSION INTRAVENOUS PRN
Status: DISCONTINUED | OUTPATIENT
Start: 2022-03-17 | End: 2022-03-17 | Stop reason: SDUPTHER

## 2022-03-17 RX ORDER — SODIUM CHLORIDE 9 MG/ML
25 INJECTION, SOLUTION INTRAVENOUS PRN
Status: CANCELLED | OUTPATIENT
Start: 2022-03-17

## 2022-03-17 RX ORDER — FENTANYL CITRATE 50 UG/ML
INJECTION, SOLUTION INTRAMUSCULAR; INTRAVENOUS PRN
Status: DISCONTINUED | OUTPATIENT
Start: 2022-03-17 | End: 2022-03-17 | Stop reason: SDUPTHER

## 2022-03-17 RX ORDER — SODIUM CHLORIDE 0.9 % (FLUSH) 0.9 %
5-40 SYRINGE (ML) INJECTION PRN
Status: DISCONTINUED | OUTPATIENT
Start: 2022-03-17 | End: 2022-03-17 | Stop reason: HOSPADM

## 2022-03-17 RX ORDER — SODIUM CHLORIDE 9 MG/ML
INJECTION, SOLUTION INTRAVENOUS CONTINUOUS PRN
Status: DISCONTINUED | OUTPATIENT
Start: 2022-03-17 | End: 2022-03-17 | Stop reason: SDUPTHER

## 2022-03-17 RX ORDER — SODIUM CHLORIDE 0.9 % (FLUSH) 0.9 %
5-40 SYRINGE (ML) INJECTION EVERY 12 HOURS SCHEDULED
Status: DISCONTINUED | OUTPATIENT
Start: 2022-03-17 | End: 2022-03-17 | Stop reason: HOSPADM

## 2022-03-17 RX ADMIN — SODIUM CHLORIDE, PRESERVATIVE FREE 10 ML: 5 INJECTION INTRAVENOUS at 09:41

## 2022-03-17 RX ADMIN — LIDOCAINE HYDROCHLORIDE 50 MG: 10 INJECTION, SOLUTION INFILTRATION; PERINEURAL at 12:15

## 2022-03-17 RX ADMIN — TAMSULOSIN HYDROCHLORIDE 0.4 MG: 0.4 CAPSULE ORAL at 16:45

## 2022-03-17 RX ADMIN — INSULIN LISPRO 1 UNITS: 100 INJECTION, SOLUTION INTRAVENOUS; SUBCUTANEOUS at 16:45

## 2022-03-17 RX ADMIN — MIDAZOLAM 2 MG: 1 INJECTION INTRAMUSCULAR; INTRAVENOUS at 12:08

## 2022-03-17 RX ADMIN — Medication 30 MG: at 12:14

## 2022-03-17 RX ADMIN — SUGAMMADEX 200 MG: 100 INJECTION, SOLUTION INTRAVENOUS at 13:31

## 2022-03-17 RX ADMIN — PANTOPRAZOLE SODIUM 40 MG: 40 TABLET, DELAYED RELEASE ORAL at 06:00

## 2022-03-17 RX ADMIN — DEXAMETHASONE SODIUM PHOSPHATE 8 MG: 4 INJECTION, SOLUTION INTRAMUSCULAR; INTRAVENOUS at 12:21

## 2022-03-17 RX ADMIN — GLYCOPYRROLATE 0.2 MG: 0.2 INJECTION, SOLUTION INTRAMUSCULAR; INTRAVENOUS at 12:21

## 2022-03-17 RX ADMIN — SODIUM CHLORIDE 2 ML: 9 INJECTION, SOLUTION INTRAMUSCULAR; INTRAVENOUS; SUBCUTANEOUS at 12:43

## 2022-03-17 RX ADMIN — PRAVASTATIN SODIUM 40 MG: 40 TABLET ORAL at 09:19

## 2022-03-17 RX ADMIN — VECURONIUM BROMIDE 5 MG: 1 INJECTION, POWDER, LYOPHILIZED, FOR SOLUTION INTRAVENOUS at 12:19

## 2022-03-17 RX ADMIN — Medication 1640 UNITS/HR: at 06:00

## 2022-03-17 RX ADMIN — VECURONIUM BROMIDE 2 MG: 1 INJECTION, POWDER, LYOPHILIZED, FOR SOLUTION INTRAVENOUS at 12:43

## 2022-03-17 RX ADMIN — Medication 140 MG: at 12:15

## 2022-03-17 RX ADMIN — Medication 300 MCG: at 12:56

## 2022-03-17 RX ADMIN — SODIUM CHLORIDE: 9 INJECTION, SOLUTION INTRAVENOUS at 09:43

## 2022-03-17 RX ADMIN — SODIUM CHLORIDE, PRESERVATIVE FREE 10 ML: 5 INJECTION INTRAVENOUS at 09:19

## 2022-03-17 RX ADMIN — SODIUM CHLORIDE 4 MCG/MIN: 9 INJECTION, SOLUTION INTRAVENOUS at 13:19

## 2022-03-17 RX ADMIN — SODIUM CHLORIDE 5 ML: 9 INJECTION, SOLUTION INTRAMUSCULAR; INTRAVENOUS; SUBCUTANEOUS at 12:19

## 2022-03-17 RX ADMIN — APIXABAN 5 MG: 5 TABLET, FILM COATED ORAL at 09:19

## 2022-03-17 RX ADMIN — FENTANYL CITRATE 100 MCG: 50 INJECTION INTRAMUSCULAR; INTRAVENOUS at 12:09

## 2022-03-17 RX ADMIN — METOPROLOL SUCCINATE 25 MG: 25 TABLET, EXTENDED RELEASE ORAL at 15:04

## 2022-03-17 RX ADMIN — DILTIAZEM HYDROCHLORIDE 60 MG: 60 TABLET, FILM COATED ORAL at 00:43

## 2022-03-17 RX ADMIN — Medication 200 MCG: at 12:47

## 2022-03-17 RX ADMIN — Medication 20 MG: at 12:59

## 2022-03-17 RX ADMIN — SODIUM CHLORIDE: 9 INJECTION, SOLUTION INTRAVENOUS at 12:48

## 2022-03-17 RX ADMIN — SODIUM CHLORIDE: 9 INJECTION, SOLUTION INTRAVENOUS at 12:08

## 2022-03-17 RX ADMIN — PROPOFOL 70 MG: 10 INJECTION, EMULSION INTRAVENOUS at 12:15

## 2022-03-17 RX ADMIN — DILTIAZEM HYDROCHLORIDE 60 MG: 60 TABLET, FILM COATED ORAL at 06:00

## 2022-03-17 RX ADMIN — Medication 200 MCG: at 12:42

## 2022-03-17 RX ADMIN — ONDANSETRON 4 MG: 2 INJECTION INTRAMUSCULAR; INTRAVENOUS at 12:21

## 2022-03-17 ASSESSMENT — PAIN SCALES - GENERAL
PAINLEVEL_OUTOF10: 0
PAINLEVEL_OUTOF10: 3

## 2022-03-17 ASSESSMENT — PULMONARY FUNCTION TESTS
PIF_VALUE: 1
PIF_VALUE: 16
PIF_VALUE: 17
PIF_VALUE: 16
PIF_VALUE: 17
PIF_VALUE: 16
PIF_VALUE: 16
PIF_VALUE: 1
PIF_VALUE: 16
PIF_VALUE: 0
PIF_VALUE: 17
PIF_VALUE: 16
PIF_VALUE: 16
PIF_VALUE: 15
PIF_VALUE: 16
PIF_VALUE: 1
PIF_VALUE: 16
PIF_VALUE: 16
PIF_VALUE: 1
PIF_VALUE: 16
PIF_VALUE: 17
PIF_VALUE: 5
PIF_VALUE: 1
PIF_VALUE: 16
PIF_VALUE: 1
PIF_VALUE: 17
PIF_VALUE: 16
PIF_VALUE: 17
PIF_VALUE: 17
PIF_VALUE: 16
PIF_VALUE: 17
PIF_VALUE: 17
PIF_VALUE: 16
PIF_VALUE: 1
PIF_VALUE: 17
PIF_VALUE: 16
PIF_VALUE: 16
PIF_VALUE: 17
PIF_VALUE: 17
PIF_VALUE: 5
PIF_VALUE: 17
PIF_VALUE: 15
PIF_VALUE: 0
PIF_VALUE: 16
PIF_VALUE: 48
PIF_VALUE: 16
PIF_VALUE: 17
PIF_VALUE: 17
PIF_VALUE: 16
PIF_VALUE: 18
PIF_VALUE: 15
PIF_VALUE: 5
PIF_VALUE: 17
PIF_VALUE: 17
PIF_VALUE: 1
PIF_VALUE: 17
PIF_VALUE: 16
PIF_VALUE: 17
PIF_VALUE: 6
PIF_VALUE: 16
PIF_VALUE: 0
PIF_VALUE: 17
PIF_VALUE: 16
PIF_VALUE: 17
PIF_VALUE: 16
PIF_VALUE: 0
PIF_VALUE: 17
PIF_VALUE: 16
PIF_VALUE: 16
PIF_VALUE: 17
PIF_VALUE: 16
PIF_VALUE: 22
PIF_VALUE: 15
PIF_VALUE: 16
PIF_VALUE: 17
PIF_VALUE: 15
PIF_VALUE: 16
PIF_VALUE: 17
PIF_VALUE: 17
PIF_VALUE: 16
PIF_VALUE: 17
PIF_VALUE: 0
PIF_VALUE: 17
PIF_VALUE: 17

## 2022-03-17 NOTE — PROGRESS NOTES
Cardiac Electrophysiology Progress Note     Admit Date: 3/15/2022     Reason for follow up: atrial flutter    HPI and Interval History:   Suresh Shaffer is a 64y.o. 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 He also had bright red blood per rectum while at home yesterday morning after he tried to stimulate his rectum to have a bowel movement. He then noted bright red blood with a possible clot. He had a routine appointment with his PCP but EMS was called due to tachycardia and hypotension. While in the ED, he was noted to be in atrial flutter with v-rates in the 130s. He was given a bolus of IV diltiazem and started on a drip with improvement in his rates so the drip has since be stopped. This morning, he is in atrial flutter, v-rates 100s-130s at rest. Denies any palpitations, dyspnea or recently increased fatigue. No chest pain. Does admit to LUQ pain for the past 2 years which has been worked up by GI. No syncope or near syncope. No further bleeding since being admitted to the hospital and H&H has been WNL. Continues to be in atrial flutter, v-rates 100s. Had BM this morning with no bleeding. Feeling about the same overall, no cardiac complaints.      Physical Examination:  Vitals:    22 0920   BP: (!) 156/94   Pulse: 107   Resp: 11   Temp: 97.9 °F (36.6 °C)   SpO2: 97%        Intake/Output Summary (Last 24 hours) at 3/17/2022 1150  Last data filed at 3/17/2022 0204  Gross per 24 hour   Intake 540 ml   Output    Net 540 ml     In: 540 [P.O.:540]  Out: -    Wt Readings from Last 3 Encounters:   22 236 lb 15.9 oz (107.5 kg)   03/15/22 240 lb 12.8 oz (109.2 kg)   21 246 lb 9.6 oz (111.9 kg)     Temp  Av.8 °F (36.6 °C)  Min: 97.4 °F (36.3 °C)  Max: 98 °F (36.7 °C)  Pulse  Av.8  Min: 99  Max: 107  BP  Min: 121/81  Max: 161/83  SpO2  Av.3 %  Min: 97 %  Max: 98 %    · Telemetry: Atrial flutter v-rates 100s. · Constitutional: Alert, in no acute distress. Appears stated age. · Head: Normocephalic and atraumatic. · Eyes: Conjunctivae normal. EOM are normal.   · Neck: Neck supple. No lymphadenopathy. No rigidity. No JVD present. · Cardiovascular: Slightly fast rate, IRR. No murmurs, rubs or gallops. No S3 or S4.  · Pulmonary/Chest: Clear breath sounds bilaterally. No crackles, wheezes or rhonchi. No respiratory accessory muscle use. · Abdominal: Soft. Normal bowel sounds present. No distension, No tenderness. · Musculoskeletal: No tenderness. No edema    · Lymphadenopathy: Has no cervical adenopathy. · Neurological: Alert and oriented. No gross deficits. · Skin: Skin is warm and dry. No rash, lesions, ulcerations noted. · Psychiatric: No anxiety or agitation. Labs, diagnostic and imaging results reviewed. Reviewed. Recent Labs     03/15/22  1205 22  0413    139   K 4.1 3.8    104   CO2 25 23   BUN 14 11   CREATININE 0.5* 0.5*     Recent Labs     03/15/22  1205 22  0001 22  1016 22  1943 22  0743   WBC 6.7  --  6.0  --   --    HGB 15.2   < > 13.8 13.5 14.0   HCT 45.5   < > 41.7 40.2* 41.9   MCV 91.2  --  91.7  --   --      --  173  --   --     < > = values in this interval not displayed. No results found for: CKTOTAL, CKMB, CKMBINDEX, TROPONINI  Estimated Creatinine Clearance: 154 mL/min (A) (based on SCr of 0.5 mg/dL (L)).    No results found for: BNP  Lab Results   Component Value Date    PROTIME 12.2 03/15/2022    PROTIME 11.1 2018    INR 1.08 03/15/2022    INR 0.98 2018     Lab Results   Component Value Date    CHOL 151 2021    HDL 60 2021    HDL 36 2012    TRIG 136 2021       Scheduled Meds:   apixaban  5 mg Oral BID    sodium chloride flush  5-40 mL IntraVENous 2 times per day    insulin lispro  0-6 Units SubCUTAneous TID WC    insulin lispro  0-3 Units SubCUTAneous Nightly    dilTIAZem  60 mg Oral 4 times per day    dapagliflozin  5 mg Oral QAM    pantoprazole  40 mg Oral QAM AC    pravastatin  40 mg Oral Daily    tamsulosin  0.4 mg Oral QPM    sodium chloride flush  5-40 mL IntraVENous 2 times per day     Continuous Infusions:   sodium chloride 75 mL/hr at 03/17/22 0943    sodium chloride      dextrose      sodium chloride       PRN Meds:sodium chloride flush, sodium chloride, glucose, dextrose, glucagon (rDNA), dextrose, sodium chloride flush, sodium chloride, ondansetron **OR** ondansetron, polyethylene glycol, acetaminophen **OR** acetaminophen, perflutren lipid microspheres     ECG: 3/15/22  Atrial fibrillation versus flutter at 125 BPM. RBBB. Inferior T wave inversion.     Echo: 3/16/22   Left ventricular cavity size is mildly dilated. There is mild concentric left ventricular hypertrophy. Pt appears to be in A fib. Ejection fraction is mildly reduced but accurate LVEF cannot be calculated   due to A fib   Indeterminate diastolic function. No evidence of left ventricular mass or thrombus noted. Mitral annular calcification is present. Calcification of the leaflets of the mitral valve. Trivial mitral regurgitation. The left atrium is moderately dilated. Aortic valve leaflets appear mildly thickened. Normal right ventricular size.    Right ventricular systolic function is grossly normal   The right atrium is mildly dilated.     Stress echo: 12/14/20       o Negative ECG for ischemia with graded exercise test.      o Negative stress echo with no indication of inducible ischemia.      o Duke Treadmill Score is 5 which indicates low risk.      o Stress echo findings indicate low risk for future ischemic event .      Cath: 4/27/12  Findings/Summary:   Andrés Orellana CORONARY ANGIOGRAPHY FINDINGS       DOMINANCE:  Left dominant           LEFT MAIN: Normal                         LEFT ANTERIOR DESCENDING:  Luminal irregularities 30% mid lesion       D3: 30% mid lesion               LEFT CIRCUMFLEX: Luminal irregularities      OM1: 30% proximal     lesion                   RIGHT CORONARY:  Luminal irregularities and Non-dominant                 LEFT VENTRICULAR FUNCTION:           LVEF: 50%           LVEDP: Normal pre-angio           LV Systolic Pressure: Normal           LV to AO Gradient: none           LV Wall Motion:  Normal,      .           MITRAL VALVE: No mitral insufficiency       Assessment and Plan:     New onset atrial flutter with RVR              - mostly atrial flutter seen on monitor but possibly some periods atrial fibrillation as well              - unclear duration as pt is unable to recall any profound new symptoms recently              - on PO diltiazem (short-acting) with hold parameters - may need to change to beta blocker if EF is reduced              - CHADS2-VASc 5 (age, HTN, DM, CAD) - no evidence of further bleeding, will change heparin drip to Eliquis 5mg BID              - plans to do SABRINA/DCCV but now have availability to for ablation which is ultimately what would be recommended for possible resolution of his atrial flutter.  Discussed the procedure with him and he would like to proceed with ablation today     Rectal bleeding              - does not seem to be actively bleeding, H&H has been WNL on serial checks              - issues with constipation may have been worsened by verapamil              - care per GI     CAD              - nonobstructive disease on [facility] in 2012              - denies angina              - on statin     Essential HTN              - BP improved      RHETT Pham  [facility], [address] [place] [address]  Phone: [phone]  Fax: [phone]    Electronically signed by RHETT Duncan - CNP on 3/17/2022 at 11:50 AM

## 2022-03-17 NOTE — ANESTHESIA POSTPROCEDURE EVALUATION
Department of Anesthesiology  Postprocedure Note    Patient: Josh Franco  MRN: [id number]  YOB: 1945  Date of evaluation: 3/17/2022  Time:  2:55 PM     Procedure Summary     Date: 03/17/22 Room / Location: [facility] Cath Lab; [facility] Echo    Anesthesia Start: 0503 Anesthesia Stop: 2956    Procedure: SABRINA AFLUTTER ABLATION W/ ANES Diagnosis:     Scheduled Providers:  Responsible Provider: Nik Younger MD    Anesthesia Type: General ASA Status: 3          Anesthesia Type: General    Rylan Phase I:      Rylan Phase II:      Last vitals: Reviewed and per EMR flowsheets.        Anesthesia Post Evaluation    Patient location during evaluation: bedside  Patient participation: complete - patient participated  Level of consciousness: awake  Pain score: 2  Airway patency: patent  Nausea & Vomiting: no nausea and no vomiting  Complications: no  Cardiovascular status: blood pressure returned to baseline  Respiratory status: acceptable  Hydration status: euvolemic

## 2022-03-17 NOTE — PROGRESS NOTES
[address] [facility] Internal Medicine Note      Chief Complaint: I feel ok    Subjective/Interval History:    Patient awaiting SABRINA/cardioversion today. Normal bowel movement yesterday, no further bleeding. Tolerating heparin drip without bleeding. Appreciate GI input. Patient denies palpitations. Eating and drinking without difficulty. No other new problems noted overnight. No chest pain or shortness breath. No cough or sputum. No nausea, vomiting, diarrhea. No abdominal pain. No dysuria. The remainder of the review of systems is negative. PMH, PSH, FH/SH reviewed and unchanged as documented in the H&P personally documented at admission 3/16/22    Medication list reviewed    Objective:    /86   Pulse 106   Temp 97.8 °F (36.6 °C) (Axillary)   Resp 19   Ht 6' (1.829 m)   Wt 236 lb 15.9 oz (107.5 kg)   SpO2 98%   BMI 32.14 kg/m²   Temp  Av.8 °F (36.6 °C)  Min: 97.4 °F (36.3 °C)  Max: 98 °F (36.7 °C)    RRR  Chest-irregularly irregular. Atrial flutter on telemetry currently. Heart rate 107.   Abd-BS+, soft, NTND  Ext- no edema    The Following Labs Were Reviewed Today:    Recent Results (from the past 24 hour(s))   CBC    Collection Time: 22 10:16 AM   Result Value Ref Range    WBC 6.0 4.0 - 11.0 K/uL    RBC 4.55 4.20 - 5.90 M/uL    Hemoglobin 13.8 13.5 - 17.5 g/dL    Hematocrit 41.7 40.5 - 52.5 %    MCV 91.7 80.0 - 100.0 fL    MCH 30.4 26.0 - 34.0 pg    MCHC 33.2 31.0 - 36.0 g/dL    RDW 14.0 12.4 - 15.4 %    Platelets 393 172 - 263 K/uL    MPV 9.2 5.0 - 10.5 fL    SLIDE REVIEW see below    APTT    Collection Time: 22 10:16 AM   Result Value Ref Range    aPTT 29.8 26.2 - 38.6 sec   POCT Glucose    Collection Time: 22 12:00 PM   Result Value Ref Range    POC Glucose 160 (H) 70 - 99 mg/dl    Performed on ACCU-CHEK    APTT    Collection Time: 22  3:32 PM   Result Value Ref Range    aPTT 35.7 26.2 - 38.6 sec   COVID-19, Rapid    Collection Time: 22  4:00 PM    Specimen: Nasopharyngeal Swab; Throat   Result Value Ref Range    SARS-CoV-2, NAAT Not Detected Not Detected   POCT Glucose    Collection Time: 03/16/22  4:30 PM   Result Value Ref Range    POC Glucose 132 (H) 70 - 99 mg/dl    Performed on ACCU-CHEK    Hemoglobin and Hematocrit    Collection Time: 03/16/22  7:43 PM   Result Value Ref Range    Hemoglobin 13.5 13.5 - 17.5 g/dL    Hematocrit 40.2 (L) 40.5 - 52.5 %   POCT Glucose    Collection Time: 03/16/22  8:30 PM   Result Value Ref Range    POC Glucose 182 (H) 70 - 99 mg/dl    Performed on ACCU-CHEK    APTT    Collection Time: 03/16/22 11:49 PM   Result Value Ref Range    aPTT 55.6 (H) 26.2 - 38.6 sec   Hemoglobin and Hematocrit    Collection Time: 03/17/22  7:43 AM   Result Value Ref Range    Hemoglobin 14.0 13.5 - 17.5 g/dL    Hematocrit 41.9 40.5 - 52.5 %       ASSESSMENT/PLAN:      Principal Problem:    Atrial fibrillation/Atrial Flutter with RVR-SABRINA/cardioversion today. Tolerating heparin drip. Transitioned to Eliquis. Discharge plan/timing per cardiology. Discussed Eliquis use with hematology. Current data supports it is okay to use Eliquis in patients previously who had undergone Jhony-en-Y bypass surgery. Active Problems:    Bright red rectal bleeding-no further bleeding. If constipation in the future plan to use MiraLAX daily. Appreciate GI input in regards to bleeding. Essential hypertension-blood pressure is okay. Currently off lisinopril. Patient does have a history of microalbuminuria so we will need at least low-dose ACE inhibitor long-term. Type 2 diabetes mellitus without complication, without long-term current use of insulin-most recent A1c as an outpatient was reasonable. Continue to work on diet and exercise. Hyperlipidemia-stable on current statin dosing. LDL 51 on recent outpatient lab testing. [facility] disposition after outcome of cardioversion known.     Arlyn Moran MD, [address]  8:49 AM  3/17/2022

## 2022-03-17 NOTE — PROCEDURES
assessment (Mallampatti classification, class 2) was completed. Sedation was done with anesthesia. .   After injection of 2% lidocaine in the right groin, right femoral vein access was obtained using modified seldinger technique without difficulty. Ultrasound was used for femoral venous access. We gained access to right femoral vein. Using modified Seldinger technique and ultrasound guidance. The femoral vein was identified with real time visualization of needle passage to the venous lumen. A SRO sheath and 2 long 8.5 F sheaths was introduced to the right femoral for CS and ICE . Procedure was done without fluoroscopy   Using 3-D mapping system and ICE, we advanced ICE catheter in RA through Sr0 sheath and  then we advanced a decapolar catheter into the coronary sinus so the distal poles were in the coronary sinus for left atrial recording and mapping. Then we exchanged and advanced an irrigated ablation catheter through the SRO sheath which was placed into the right atrium. Patient had atrial flutter with cycle length of 226 ms. Entrainment was performed from the septal, mid and lateral cavotricuspid isthmus demonstrated concealed entrainment with a short return cycle length consistent with critical areas of the circuit. Entrainment from distal coronary sinus ruled out the left sided atrial flutter. The diagnosis of typical cavotricuspid isthmus dependent flutter was made. Using three dimensional electroanatomical mapping of the cavotricuspid isthmus which was created by Cart[employer], an irrigated Smart touch [employer] SF ablation catheter was advanced into position along the ventricular septal aspect of the cavotricuspid isthmus under fluoroscopic guidance and 3-D mapping. ICE showed a prominent pouch and geometry was created and therefore catheter location and contact was monitored with ICE contour.    Radiofrequency lesions were delivered in a linear fashion until the tachycardia terminated. Then while maintaining pacing from the proximal coronary sinus, additional lesions were delivered to the isthmus until bidirectional block was observed. Bidirectional block was confirmed by noting split potentials along the ablation line (> 100 ms). Differential pacing from medial and lateral side of the line also confirmed bidirectional block. Then we did our electrophysiologic studies, programmed stimulation, by using our CS catheter in addition to our ablation catheter which was place into the right atrium and right ventricle sequentially. We also attempted to induce arrhythmia by programmed stimulation and burst pacing. · Sinus node cycle length 675 ms   · QRS interval 138 ms   · A-H interval: 78 msec   · H-V interval of 58 msec . · 1:1 antegrade conduction over AV node (AV node wenckebach) was 260 msec. · AV rell effective refractory period (ERP) was 500/200 ms     Following ablation, programmed stimulation and burst pacing was performed in the right atrium in an attempt to induce any arrhythmia. There was no inducible tachyarrhythmias. IV drug infusion was given(Isoproterenol at 4 mcg/min) and programmed stim and burst pacing was repeated. No sustained arrhythmia was induced. Then catheters were removed. Sheaths were removed and hemostasis achieved by compression. The patient tolerated the procedure well and there were no complications. Post-sedation evaluation was completed. Patient was transported to the holding area in stable condition. Blood loss <06 cc  No complication    Conclusion:  Successful ablation of cavotricuspid isthmus dependant atrial flutter    Plan:   Patient will be admitted to the floor for monitoring and will receive usual post-ablation care. Patient needs continuing anticoagulation for at least 6 weeks post ablation. Patient needs follow up with electrophysiology within one month.       Torey Yepez MD  Cardiac Electrophysiology  [facility]

## 2022-03-17 NOTE — ANESTHESIA PRE PROCEDURE
Department of Anesthesiology  Preprocedure Note       Name:  Rj Holbrook   Age:  64 y.o.  :  1945                                          MRN:  [id number]         Date:  3/17/2022      Surgeon: * No surgeons listed *    Procedure: * No procedures listed *    Medications prior to admission:   Prior to Admission medications    Medication Sig Start Date End Date Taking? Authorizing Provider   metFORMIN (GLUCOPHAGE) 1000 MG tablet TAKE 1 TABLET TWICE A DAY WITH MEALS 22   Sujata Long MD   dapagliflozin [facility]) 5 MG tablet Take 1 tablet by mouth every morning 21   Sujata Long MD   pravastatin (PRAVACHOL) 40 MG tablet TAKE 1 TABLET DAILY 21   Sujata Long MD   tamsulosin [facility]) 0.4 MG capsule TAKE 1 CAPSULE EVERY EVENING 10/20/21   Sujata Long MD   verapamil (CALAN SR) 180 MG extended release tablet TAKE 1 TABLET DAILY 10/20/21   Sujata Long MD   lisinopril (PRINIVIL;ZESTRIL) 20 MG tablet TAKE 1 TABLET DAILY 10/20/21   Sujata Long MD   omeprazole (PRILOSEC) 40 MG delayed release capsule TAKE 1 CAPSULE DAILY 21   Sujata Long MD   aspirin 81 MG tablet Take 81 mg by mouth daily    Historical Provider, MD   Cyanocobalamin (VITAMIN B-12) 1000 MCG SUBL Place 1 tablet under the tongue once a week  3/21/18      vitamin D (CHOLECALCIFEROL) 1000 UNIT TABS tablet Take 1,000 Units by mouth daily    Historical Provider, MD   CALCIUM CITRATE PO Take 1,000 mcg by mouth 4 times daily     Historical Provider, MD   Multiple Vitamin (MULTI-VITAMIN PO) Take 1 tablet by mouth 2 times daily. Historical Provider, MD       Current medications:    No current facility-administered medications for this encounter. No current outpatient medications on file.      Facility-Administered Medications Ordered in Other Encounters   Medication Dose Route Frequency Provider Last Rate Last Admin    apixaban (ELIQUIS) tablet 5 mg  5 mg Oral BID Megandiya Carrasquillo APRN - CNP   5 mg at 03/17/22 0919    0.9 % sodium chloride infusion   IntraVENous Continuous Megan RHETT Carrasquillo - CNP 75 mL/hr at 03/17/22 0943 New Bag at 03/17/22 0943    sodium chloride flush 0.9 % injection 5-40 mL  5-40 mL IntraVENous 2 times per day Megandiya Carrasquillo APRN - CNP        sodium chloride flush 0.9 % injection 5-40 mL  5-40 mL IntraVENous PRN Megan Carrasquillo, APRN - CNP        0.9 % sodium chloride infusion  25 mL IntraVENous PRN Megan Carrasquillo, APRN - CNP        insulin lispro (HUMALOG) injection vial 0-6 Units  0-6 Units SubCUTAneous TID WC Laura Casey MD   1 Units at 03/16/22 1235    insulin lispro (HUMALOG) injection vial 0-3 Units  0-3 Units SubCUTAneous Nightly Laura Casey MD   1 Units at 03/16/22 2033    glucose (GLUTOSE) 40 % oral gel 15 g  15 g Oral PRN Laura Casey MD        dextrose 50 % IV solution  12.5 g IntraVENous PRN Larua Casey MD        glucagon (rDNA) injection 1 mg  1 mg IntraMUSCular PRN Laura Casey MD        dextrose 5 % solution  100 mL/hr IntraVENous PRN Laura Casey MD        dilTIAZem (CARDIZEM) tablet 60 mg  60 mg Oral 4 times per day Megan Carrasquillo APRN - CNP   60 mg at 03/17/22 0600    dapagliflozin (FARXIGA) tablet 5 mg  5 mg Oral QAM Laura Casey MD        pantoprazole (PROTONIX) tablet 40 mg  40 mg Oral QAM AC Laura Casey MD   40 mg at 03/17/22 0600    pravastatin (PRAVACHOL) tablet 40 mg  40 mg Oral Daily Laura Casey MD   40 mg at 03/17/22 0919    tamsulosin (FLOMAX) capsule 0.4 mg  0.4 mg Oral QPM Laura Casey MD   0.4 mg at 03/16/22 1803    sodium chloride flush 0.9 % injection 5-40 mL  5-40 mL IntraVENous 2 times per day Lauar Casey MD   10 mL at 03/17/22 0941    sodium chloride flush 0.9 % injection 5-40 mL  5-40 mL IntraVENous PRN Laura Casey MD        0.9 % sodium chloride infusion  25 mL IntraVENous PRN Buzz Tang MD        ondansetron (ZOFRAN-ODT) disintegrating tablet 4 mg  4 mg Oral Q8H PRN Buzz Tang MD        Or    ondansetron [facility]) injection 4 mg  4 mg IntraVENous Q6H PRN Buzz Tang MD        polyethylene glycol [facility]) packet 17 g  17 g Oral Daily PRN Buzz Tang MD        acetaminophen (TYLENOL) tablet 650 mg  650 mg Oral Q6H PRN Buzz Tang MD        Or    acetaminophen (TYLENOL) suppository 650 mg  650 mg Rectal Q6H PRN Buzz Tang MD        perflutren lipid microspheres (DEFINITY) injection 1.65 mg  1.5 mL IntraVENous ONCE PRN Buzz Tang MD           Allergies:     Allergies   Allergen Reactions    Horse Protein Swelling     Serum       Problem List:    Patient Active Problem List   Diagnosis Code    Hyperlipidemia E78.5    Impotence N52.9    Benign prostatic hyperplasia N40.0    Essential hypertension I10    Type 2 diabetes mellitus without complication, without long-term current use of insulin ([address] E11.9    Radiculopathy of lumbosacral region M54.17    Prostate cancer ([address] C61    Tear of gluteus minimus tendon S76.019A    Primary osteoarthritis of right hip M16.11    Intractable hiccups R06.6    Slow transit constipation K59.01    Atrial fibrillation with RVR ([facility]) I48.91    Bright red rectal bleeding K62.5       Past Medical History:        Diagnosis Date    Arthritis     Hyperlipidemia     Hypertension     Prostate cancer ([address] 12/2014    Focal Prostate Cancer found on pathology report at the time of TURP    Sleep apnea     no BiPap since gastric surgery    Spinal stenosis     Type II or unspecified type diabetes mellitus without mention of complication, not stated as uncontrolled     diet controlled since gastric surgery       Past Surgical History:        Procedure Laterality Date    CHOLECYSTECTOMY  1980's    COLONOSCOPY 2014    Dr cordova-diverticulosis, polyp, repeat 3 years    COLONOSCOPY  08/15/2017    Dr Brian Leach- sigmoid diverticulosis, small adenomatous polyp, repeat in 5 years    COLONOSCOPY  2021    Nelson- sigmoid diverticulosis, repeat in 5 years    FOOT SURGERY      Left bone fused    JOINT REPLACEMENT      THR    PROSTATE SURGERY   approx    TURP ([place])    ROTATOR CUFF REPAIR Bilateral     left 2016. left x 2, right x 1    IDANIA-EN-Y GASTRIC BYPASS  2012    [facility]    SHOULDER SURGERY Bilateral     rotator cuff    TOTAL HIP ARTHROPLASTY  2007    Right    TURP  2014    cysto TURP, Focal Prostate Cancer on Pathology       Social History:    Social History     Tobacco Use    Smoking status: Former Smoker     Packs/day: 0.25     Years: 7.00     Pack years: 1.75     Types: Cigarettes     Start date: 12/10/1964     Quit date: 1980     Years since quittin.2    Smokeless tobacco: Never Used   Substance Use Topics    Alcohol use: Yes     Comment: One glass of one each day                                Counseling given: Not Answered      Vital Signs (Current): There were no vitals filed for this visit.                                            BP Readings from Last 3 Encounters:   22 (!) 156/94   03/15/22 92/62   21 120/60       NPO Status:                                                                                 BMI:   Wt Readings from Last 3 Encounters:   22 236 lb 15.9 oz (107.5 kg)   03/15/22 240 lb 12.8 oz (109.2 kg)   21 246 lb 9.6 oz (111.9 kg)     There is no height or weight on file to calculate BMI.    CBC:   Lab Results   Component Value Date    WBC 6.0 2022    RBC 4.55 2022    HGB 14.0 2022    HCT 41.9 2022    MCV 91.7 2022    RDW 14.0 2022     2022       CMP:   Lab Results   Component Value Date     2022    K 3.8 2022     2022    CO2 23 03/16/2022    BUN 11 03/16/2022    CREATININE 0.5 03/16/2022    GFRAA >60 03/16/2022    GFRAA >60 06/05/2013    AGRATIO 1.4 03/15/2022    LABGLOM >60 03/16/2022    LABGLOM 112.5 06/23/2011    GLUCOSE 149 03/16/2022    GLUCOSE 162 06/23/2011    PROT 6.6 03/15/2022    PROT 7.2 10/26/2012    CALCIUM 8.5 03/16/2022    BILITOT 0.3 03/15/2022    ALKPHOS 55 03/15/2022    AST 18 03/15/2022    ALT 16 03/15/2022       POC Tests:   Recent Labs     03/16/22 2030   POCGLU 182*       Coags:   Lab Results   Component Value Date    PROTIME 12.2 03/15/2022    INR 1.08 03/15/2022    APTT 69.6 03/17/2022       HCG (If Applicable): No results found for: PREGTESTUR, PREGSERUM, HCG, HCGQUANT     ABGs: No results found for: PHART, PO2ART, HRY0ETM, JNK1EME, BEART, A9XDGXKQ     Type & Screen (If Applicable):  No results found for: LABABO, LABRH    Drug/Infectious Status (If Applicable):  No results found for: HIV, HEPCAB    COVID-19 Screening (If Applicable):   Lab Results   Component Value Date    COVID19 Not Detected 03/16/2022           Anesthesia Evaluation  Patient summary reviewed no history of anesthetic complications:   Airway: Mallampati: II     Neck ROM: full   Dental: normal exam         Pulmonary:   (+) sleep apnea:                             Cardiovascular:    (+) hypertension:, dysrhythmias:, hyperlipidemia    (-) pacemaker                Neuro/Psych:   (+) neuromuscular disease:,             GI/Hepatic/Renal:        (-) liver disease, no renal disease and bowel prep       Endo/Other:    (+) Diabetes, : arthritis:., .                 Abdominal:             Vascular: negative vascular ROS. Other Findings:             Anesthesia Plan      general     ASA 3     (Reports asymptomatic with Afib/rvr )  Induction: intravenous. MIPS: Prophylactic antiemetics administered. Anesthetic plan and risks discussed with patient. Plan discussed with CRNA.                   Dominga Copeland MD   3/17/2022

## 2022-03-17 NOTE — PROGRESS NOTES
Reviewed discharge instructions and medications. Patient verbalized understanding. Gave 30 day supply of Eliquis. Patient left unit on foot for discharge home.

## 2022-03-17 NOTE — H&P
H&P Update    I have reviewed the history and physical that I performed yesterday and examined the patient and find no relevant changes. I have reviewed with the patient and/or family the risks, benefits, and alternatives to the procedure. Pre-sedation Assessment    Patient:  Christiane Cohen   :   1945  Intended Procedure: SABRINA then right sided flutter ablation. Nurses notes reviewed and agreed. Medications reviewed  Allergies:    Allergies   Allergen Reactions    Horse Protein Swelling     Serum         Pre-Procedure Assessment/Plan:  ASA 3 - Patient with moderate systemic disease with functional limitations    Level of Sedation Plan:Per anesthesia    Post Procedure plan: Return to same level of care    Luz Salas MD  Cardiac Electrophysiology  [address]

## 2022-03-17 NOTE — PROGRESS NOTES
Clinical Pharmacy Note  Heparin Dosing       Lab Results   Component Value Date    APTT 55.6 03/16/2022     Lab Results   Component Value Date    HGB 13.5 03/16/2022    HCT 40.2 03/16/2022     03/16/2022    INR 1.08 03/15/2022       Current Infusion Rate: 1430 units/hr    Plan:  Rate: Increase to 1640 units/hr  Next aPTT: 0800  3/17/22    Pharmacy will continue to monitor and adjust based on aPTT results.     Dean Wetzel, [address] [facility]  3/17/2022 1:41 AM

## 2022-03-18 ENCOUNTER — CARE COORDINATION (OUTPATIENT)
Dept: CASE MANAGEMENT | Age: 77
End: 2022-03-18

## 2022-03-18 RX ORDER — MULTIVIT WITH MINERALS/LUTEIN
1000 TABLET ORAL DAILY
COMMUNITY

## 2022-03-18 NOTE — CARE COORDINATION
Transitions of Care Initial Call    Was this an external facility discharge? No Discharge Facility: N/A    Challenges to be reviewed by the provider   Additional needs identified to be addressed with provider: No               Method of communication with provider : none      Advance Care Planning:   Advance Care Planning   Healthcare Decision Maker:    Primary Decision Maker: Lindsay Horn - [phone]    Secondary Decision Maker: Truong Ozuna - Child - [address] states he has a [address] Was this a readmission? No  Patient stated reason for admission: sent from the PCP office. Care Transition Nurse (CTN) contacted the patient by telephone to perform post hospital discharge assessment. Verified name and  with patient as identifiers. Provided introduction to self, and explanation of the CTN role. CTN reviewed discharge instructions, medical action plan and red flags with patient who verbalized understanding. Patient given an opportunity to ask questions and does not have any further questions or concerns at this time. Were discharge instructions available to patient? Yes. Reviewed appropriate site of care based on symptoms and resources available to patient including: PCP. The patient agrees to contact the PCP office for questions related to their healthcare. Medication reconciliation was performed with patient, who verbalizes understanding of administration of home medications. Discussed COVID vaccination status: Yes. Hammad Esther states he received the [employer] system vaccine x2 and [employer] booster. CTN provided contact information. No further follow-up call indicated - patient declines. Hammad Santana states he is doing \"okay\". He confirmed his hospital follow up with the PCP on 2022. He states he will provide his own transportation. Hammad Santana states he usually monitors his B/P via his watch, but has not put his watch on today.  He denies any rectal bleeding or any other outward signs of bleeding. Casa Lemus states he continues to have some dizziness, about the same as when he was in the hospital. He denies any SOB or fever. Casa Lemus states his puncture site is intact. He c/o slight chest pain. Casa Lemus states he has not had a bowel movement since he returned home - he states he is not passing flatus. Casa Lemus states this is not unusual for him. He states he often suffers from constipation and has to strain to have a BM. Informed Casa Lemus we do not want him to strain to have a BM. He states he obtained Miralax today and will begin taking that as well as discuss with his PCP at his appt. Casa Lemus states he is urinating but feels it is in lesser amounts than his normal. If this does not change he will also discuss with his PCP. Casa Lemus denies any needs at this time.     Aleshia Oro RN BSN  Care Transition Nurse  [phone]

## 2022-04-13 NOTE — TELEPHONE ENCOUNTER
Medication Refill    Medication needing refilled:  apixaban (ELIQUIS)     Dosage of the medication:  5 MG TABS tablet     How are you taking this medication (QD, BID, TID, QID, PRN):  Take 1 tablet by mouth 2 times daily    30 or 90 day supply called in: 90 day supply     When will you run out of your medication:    Which Pharmacy are we sending the medication to?:    [address], [address] [place] [address] [phone] - F [phone]   [facility] [address]   Phone:  [phone]  Fax:  [phone]

## 2022-04-13 NOTE — TELEPHONE ENCOUNTER
Last ov: Seen in hospital 3/15/22  Last labs: 3/16/22  Next appointment:4/22/22  Last RF:3/17/22 wrong pharm

## 2022-04-20 NOTE — DISCHARGE SUMMARY
[address] [facility]                               DISCHARGE SUMMARY    PATIENT NAME: Michaela Russell                   :        1945  MED REC NO:   [id number]                          ROOM:       46  ACCOUNT NO:   [de-identified]                           ADMIT DATE: 03/15/2022  PROVIDER:     Ann Núñez MD                  DISCHARGE DATE:  2022      REASON FOR ADMISSION:  Bright red blood per rectum and atrial  fibrillation/flutter. HISTORY OF PRESENT ILLNESS:  This is a 75-year-old white male with a  history of diabetes and hypertension and a remote history of prostate  cancer, who presented to the outpatient office for routine followup of  his diabetes. At the visit, he stated he was not feeling well and  reported an episode of bright red blood per rectum on the morning of  admission. The patient stated that he had some difficulty getting  started with his bowel movement and he had to manually stimulate to  create a bowel movement. He did not have any additional bleeding but  came to the outpatient office, was not feeling well, and was  lightheaded. In the office, he had a heart rate of 140 and his blood  pressure was 90/60. He was sent to the emergency room for further  evaluation and suspected GI bleeding. In the ER, EKG revealed atrial  fibrillation with rapid ventricular response. A Cardizem drip was  started and he was admitted for further evaluation and treatment. Anticoagulation was not instituted obviously and GI cardiology was  consulted. The patient did not have any additional bleeding overnight. GI had seen him initially and recommended no immediate treatment given  his recent colonoscopy. HOSPITAL COURSE:  1.  Bright red blood per rectum. The patient was seen in consultation  by Dr. Elliot Gomez.   Dr. Heydi Granados initial impression was that this  may have been perianal or limited diverticular bleed and that no  additional GI evaluation was necessary given his recent colonoscopy in  12/2021. The patient was cleared for IV heparin given his atrial  fibrillation/flutter and he had no additional bleeding throughout his  hospitalization. It was felt that no further GI workup was needed as an  outpatient. 2.  Atrial flutter. The patient again was seen by Cardiology, initially  seen to be atrial fibrillation but his EKG revealed atrial flutter with  rapid ventricular response. The patient was anticoagulated with heparin  and was taken to the Electrophysiology Lab and had cardioversion and  atrial flutter ablation. The patient was stable throughout the  hospitalization and was felt to be stable for discharge after his  cardioversion and ablation. Transesophageal echocardiogram was  performed showing no evidence of thrombus prior to the cardioversion. 3.  Hypertension. The patient's blood pressure was fine during the  course of the hospitalization. 4.  Diabetes mellitus type 2. The patient's diabetes was fairly well  controlled as an outpatient and will be followed up as an outpatient. 5.  Hyperlipidemia. He will remain on current statin therapy. DISCHARGE MEDICATIONS:  Please see the discharge med rec form for  discharge medications. FOLLOWUP:  Followup was to be with the patient's cardiologist, Dr. Carlo Ramos, at [facility] as well as with Electrophysiology at  [facility]. The patient will follow up with Dr. Alexei Ontiveros within  one week as well. CONDITION ON DISCHARGE:  Good.         Mikey Guan MD    D: 04/20/2022 10:54:36       T: 04/20/2022 10:57:10     [email or web address]  Job#: [id number]     Doc#: [id number]    CC:

## 2022-04-22 ENCOUNTER — OFFICE VISIT (OUTPATIENT)
Dept: CARDIOLOGY CLINIC | Age: 77
End: 2022-04-22
Payer: MEDICARE

## 2022-04-22 VITALS
OXYGEN SATURATION: 98 % | HEIGHT: 72 IN | HEART RATE: 72 BPM | DIASTOLIC BLOOD PRESSURE: 82 MMHG | SYSTOLIC BLOOD PRESSURE: 136 MMHG | WEIGHT: 234 LBS | BODY MASS INDEX: 31.69 KG/M2

## 2022-04-22 DIAGNOSIS — E11.9 TYPE 2 DIABETES MELLITUS WITHOUT COMPLICATION, WITHOUT LONG-TERM CURRENT USE OF INSULIN (HCC): ICD-10-CM

## 2022-04-22 DIAGNOSIS — I25.10 CORONARY ARTERY DISEASE INVOLVING NATIVE CORONARY ARTERY OF NATIVE HEART WITHOUT ANGINA PECTORIS: ICD-10-CM

## 2022-04-22 DIAGNOSIS — Z79.01 CURRENT USE OF ANTICOAGULANT THERAPY: ICD-10-CM

## 2022-04-22 DIAGNOSIS — I48.3 TYPICAL ATRIAL FLUTTER (HCC): Primary | ICD-10-CM

## 2022-04-22 DIAGNOSIS — I10 ESSENTIAL HYPERTENSION: ICD-10-CM

## 2022-04-22 LAB — PSA, TOTAL: 1.63 NG/ML (ref 0–4)

## 2022-04-22 PROCEDURE — 3051F HG A1C>EQUAL 7.0%<8.0%: CPT | Performed by: INTERNAL MEDICINE

## 2022-04-22 PROCEDURE — 99215 OFFICE O/P EST HI 40 MIN: CPT | Performed by: INTERNAL MEDICINE

## 2022-04-22 PROCEDURE — 93000 ELECTROCARDIOGRAM COMPLETE: CPT | Performed by: INTERNAL MEDICINE

## 2022-04-22 NOTE — PROGRESS NOTES
Cardiac Electrophysiology Consultation   Date: 4/22/2022   Reason for Consultation: atrial flutter  Consult Requesting Physician: Kane Sanchez MD     Chief Complaint:   Chief Complaint   Patient presents with    Follow-Up from Hospital     a flutter        HPI: Crystal Pollard is a 64 y.o. patient with a history of HTN, HLD, DM, nonobstructive CAD on [facility] in 2012 and TASHIA (not on Bipap since gastric sleeve) who presented to the [facility] [place] [facility] [place] [facility] on 3/15/2022 sent from his PCP's office due to elevated HR and hypotension. He also reported that he had bright red blood per rectum while at home the day prior to presentation after he tried to stimulate his rectum to have a bowel movement. He then noted bright red blood with a possible clot. He had a routine appointment with his PCP but EMS was called due to tachycardia and hypotension. While in the ED, he was noted to be in atrial flutter with v-rates in the 130s. He was given a bolus of IV diltiazem and started on a drip with improvement in his rates so the drip has since be stopped. No further bleeding was noted during his hospitalization and H&H was WNL. On 3/17/2022 he underwent radiofrequency ablation of SVT, Cavo tricuspid isthmus dependent atrial flutter. Interval History: Today, he presents to office for follow up s/p RFA of atrial flutter. He denies that he had any symptoms of when he was in atrial flutter. He reports that he feels well today as he did prior to the ablation. He is compliant with his medications and tolerating them well. He denies chest pain/pressure, tightness, edema, shortness of breath, heart racing, palpitations, lightheadedness, dizziness, syncope, presyncope,  PND or orthopnea.        Past Medical History:   Diagnosis Date    Arthritis     Hyperlipidemia     Hypertension     Prostate cancer ([facility] [address] 12/2014    Focal Prostate Cancer found on pathology report at the time of TURP    Sleep apnea     no BiPap since tamsulosin (FLOMAX) 0.4 MG capsule TAKE 1 CAPSULE EVERY EVENING 10/20/21  Yes Victor Hugo Tang MD   omeprazole (PRILOSEC) 40 MG delayed release capsule TAKE 1 CAPSULE DAILY 9/13/21  Yes Victor Hugo Tang MD   Cyanocobalamin (VITAMIN B-12) 1000 MCG SUBL Place 1 tablet under the tongue once a week  3/21/18  Yes    vitamin D (CHOLECALCIFEROL) 1000 UNIT TABS tablet Take 1,000 Units by mouth daily   Yes Historical Provider, MD   CALCIUM CITRATE PO Take 1,000 mcg by mouth 4 times daily    Yes Historical Provider, MD   Multiple Vitamin (MULTI-VITAMIN PO) Take 1 tablet by mouth 2 times daily. Yes Historical Provider, MD       Social History:   reports that he quit smoking about 42 years ago. His smoking use included cigarettes. He started smoking about 57 years ago. He has a 1.75 pack-year smoking history. He has never used smokeless tobacco. He reports current alcohol use. He reports that he does not use drugs. Family History:  family history includes Breast Cancer in his daughter and sister; Coronary Art Dis in his father and mother; [employer] in his mother; Stroke in his mother. Reviewed. Denies family history of sudden cardiac death, arrhythmia, premature CAD    Review of System:  Pertinent positive and negatives are in the HPI, the rest are negative. Physical Examination:  /82 (Site: Left Upper Arm, Position: Sitting)   Pulse 72   Ht 6' (1.829 m)   Wt 234 lb (106.1 kg)   SpO2 98%   BMI 31.74 kg/m²      · Constitutional: Oriented. No distress. · Head: Normocephalic and atraumatic. · Mouth/Throat: Oropharynx is clear and moist.   · Eyes: Conjunctivae normal. EOM are normal.   · Neck: Normal range of motion. Neck supple. No rigidity. No JVD present. · Cardiovascular: Normal rate, regular rhythm, S1&S2 and intact distal pulses. · Pulmonary/Chest: Bilateral respiratory sounds. No wheezes. No rhonchi. · Abdominal: Soft. Bowel sounds present. No distension, No tenderness. for future ischemic event     4. Cath: 4/27/2012  Findings/Summary:      CORONARY ANGIOGRAPHY FINDINGS       DOMINANCE:  Left dominant           LEFT MAIN: Normal                         LEFT ANTERIOR DESCENDING:  Luminal irregularities 30% mid lesion       D3: 30% mid lesion               LEFT CIRCUMFLEX: Luminal irregularities      OM1: 30% proximal     lesion                   RIGHT CORONARY:  Luminal irregularities and Non-dominant                 LEFT VENTRICULAR FUNCTION:           LVEF: 50%           LVEDP: Normal pre-angio           LV Systolic Pressure: Normal           LV to AO Gradient: none           LV Wall Motion:  Normal,      .           MITRAL VALVE: No mitral insufficiency       I independently reviewed the ECG, MCOT, echocardiogram, stress test, and coronary angiography/PCI results and used them for my plan of care. Procedures:  1. Radiofrequency ablation of SVT, Cavo tricuspid isthmus dependent atrial flutter 3/17/2022    Assessment/Plan:     Typical Atrial flutter   -Asymptomatic.   -First seen on EKG 3/15/2022. No docummented atrial fibrillation though noted on notes while in hospital.   -EKG today SR with RBBB (old)              - TSH and electrolytes WNL              -He has a  CHADS2-VASc 5 (age, HTN, DM, CAD)    - Continue Eliquis 5mg BID for thromboembolic risk reduction.   -Tolerating well no signs or symptoms of abnormal bruising or bleeding. Mr. Giovanni Rangel is doing well. We discussed the risk of developing atrial fibrillation with his past history of atrial flutter, which is very probable, given clinical studies that show that those who manifest right atrial flutter have at least a 50% chance of manifesting atrial fibrillation in the next 4-5 years. With longer follow-ups we would surmise that these patients would almost all develop atrial fibrillation. · Discussed options for monitoring for presence of atrial fibrillation.  Completing a 30 day monitor and if unrevealing recommend proceeding with ILR implantation which would allow for long term monitoring up to 4.5 years for presence of atrial fibrillation. Patient agreeable to proceed with monitor. · Discontinue anticoagulation after ILR implant. If atrial fibrillation is seen, then would restart anticoagulation. CAD                 - nonobstructive disease on [facility] in 2012              - denies angina              -Continue with medical management and risk factor modification including aspirin, statin, and beta blocker. Essential HTN   -Stable   -Continue current medical management. DM   -Continue current medical management. Plan: We will call with the results of his monitor and if unrevealing proceed with scheduling ILR implantation. Thank you for allowing me to participate in the care of Jonelle Ochoa. All questions and concerns were addressed to the patient/family. Alternatives to my treatment were discussed. This note was scribed in the presence of García Grove MD by Dai Lorenzana RN. Physician attestation: The scribe's documentation has been prepared under my direction and has been personally reviewed by me in its entirety. I confirm that the note above reflects all work, treatment, procedures, and medical decision making performed by me.      García Grove MD  Cardiac Electrophysiology  [address]

## 2022-05-02 ENCOUNTER — TELEPHONE (OUTPATIENT)
Dept: CARDIOLOGY CLINIC | Age: 77
End: 2022-05-02

## 2022-05-02 NOTE — TELEPHONE ENCOUNTER
Received an urgent report from [facility] for 04/30/2022 @ 7:21 am. No symptoms or activities indicated. Report scanned into chart.

## 2022-05-03 NOTE — TELEPHONE ENCOUNTER
Dr. Yanique Romeo,    Please review in Dr. Romell Ganser absence. Patient was seen in office by Dr. Davide Jeffries on 4/22/2022 for evaluation of atrial flutter s/p RFA of SVT, Cavo tricuspid isthmus dependent atrial flutter 3/17/2022. Monitor was placed to assess for presence of atrial fibrillation as he is at increased risk for developing atrial fibrillation given his history of atrial flutter. Please review and advise.     Thanks,  Corinne Slick, RN

## 2022-05-04 NOTE — TELEPHONE ENCOUNTER
Monitor shows atrial fibrillation/atrial flutter. Would set up clinic visit with Dr. Yessica Turpin at next available to discuss treatment options for atrial fibrillation/atrial flutter, including ablation.

## 2022-05-11 ENCOUNTER — TELEPHONE (OUTPATIENT)
Dept: CARDIOLOGY CLINIC | Age: 77
End: 2022-05-11

## 2022-05-11 NOTE — TELEPHONE ENCOUNTER
Please call patient and let him know that per OV with Dr. Manisha Christensen on 4/7/22 he restarted verapamil 180 mg daily. Questions regarding loop recorder can be discussed further with [facility] acute hospital.

## 2022-05-11 NOTE — TELEPHONE ENCOUNTER
Julianna Cantu called in this morning, he states when he was in the hospital on 3/15 they stopped his verapamil, he states he had them in his pill box and has unknowingly still been taking them he would like to know if he should still be taking them or stop as the hospital had told him to. He also has a question about his heart monitor he states he will be taking a trip to [facility] coming up and has concerns about the monitoring of the monitor. He can be reached at [phone].

## 2022-05-11 NOTE — TELEPHONE ENCOUNTER
Amilcar Escobar MA is gone for the day, please contact patient to review Holter instructions/questions patient has today. Thanks.

## 2022-05-11 NOTE — TELEPHONE ENCOUNTER
Attempted to return call to patient. Left message on voicemail to return call to our office tomorrow since office is currently closed and answering service will  tonight.

## 2022-05-11 NOTE — TELEPHONE ENCOUNTER
Patient in need of holter monitor instruction/advice. Attempted to connect to [facility] but call dropped.   Please reach out to patient to assist.

## 2022-05-12 NOTE — TELEPHONE ENCOUNTER
Called patient today. Answered his questions regarding monitor and also requested patient to confirm information with [facility] toll free number. Patient verbalized and confirmed understanding.

## 2022-05-25 ENCOUNTER — TELEPHONE (OUTPATIENT)
Dept: CARDIOLOGY CLINIC | Age: 77
End: 2022-05-25

## 2022-05-25 PROCEDURE — 93228 REMOTE 30 DAY ECG REV/REPORT: CPT | Performed by: INTERNAL MEDICINE

## 2022-05-25 NOTE — TELEPHONE ENCOUNTER
Called and spoke with patient regarding the results of his monitor. The monitor showed predominately NSR  PVC and NSVT (Polymorphic)   Recommend ischemic evaluation. I called and spoke with patient advised of results. He states that he follow with Cardiologist Teo Washburn MD and [facility] and had a Stress test on 4/4/2022 and was told it was ok. NM stress test 4/4/2022  Interpetation Summary:   The LV EF is 56%   Normal global and regional wall motion in all territories. There is a small size, fixed defect in the anteroseptal wall consistent with   soft tissue attenuation.        o Negative ECG for ischemia with graded exercise test.      o Duke Treadmill Score is 3 which indicates intermediate risk.      o Negative nuclear stress imaging for inducible ischemia.      o Nuclear stress image findings indicate low risk for future ischemic        event . I faxed the results of his monitor to Dr. Leija [facility] office at [phone]  for his review.

## 2022-06-06 DIAGNOSIS — I48.3 TYPICAL ATRIAL FLUTTER (HCC): ICD-10-CM

## 2022-06-08 RX ORDER — APIXABAN 5 MG/1
TABLET, FILM COATED ORAL
Qty: 180 TABLET | Refills: 3 | Status: SHIPPED | OUTPATIENT
Start: 2022-06-08

## 2022-11-17 PROBLEM — E11.42 PERIPHERAL SENSORY NEUROPATHY DUE TO TYPE 2 DIABETES MELLITUS (HCC): Status: ACTIVE | Noted: 2022-11-17

## 2023-03-07 ENCOUNTER — TELEPHONE (OUTPATIENT)
Dept: ORTHOPEDIC SURGERY | Age: 78
End: 2023-03-07

## 2023-03-07 NOTE — TELEPHONE ENCOUNTER
Notified [place] office regarding the medical records for patient     Records = all orthopaedic on file

## 2023-05-02 ENCOUNTER — HOSPITAL ENCOUNTER (OUTPATIENT)
Dept: CT IMAGING | Age: 78
Discharge: HOME | End: 2023-05-02
Payer: MEDICARE

## 2023-05-02 DIAGNOSIS — R51.9 ACUTE INTRACTABLE HEADACHE, UNSPECIFIED HEADACHE TYPE: ICD-10-CM

## 2023-05-02 PROCEDURE — 70450 CT HEAD/BRAIN W/O DYE: CPT

## 2023-05-03 LAB
ALBUMIN SERPL-MCNC: 4.3 G/DL (ref 3.5–5.7)
ALP BLD-CCNC: 50 IU/L (ref 35–135)
ALT SERPL-CCNC: 13 IU/L (ref 10–60)
ANION GAP SERPL CALCULATED.3IONS-SCNC: 9 MMOL/L (ref 4–16)
AST SERPL-CCNC: 15 IU/L (ref 10–40)
BASOPHILS ABSOLUTE: 0.1 THOU/MCL (ref 0–0.2)
BASOPHILS ABSOLUTE: 1 %
BILIRUB SERPL-MCNC: 0.6 MG/DL (ref 0–1.2)
BUN BLDV-MCNC: 10 MG/DL (ref 8–26)
CALCIUM SERPL-MCNC: 9 MG/DL (ref 8.5–10.4)
CHLORIDE BLD-SCNC: 100 MEQ/L (ref 98–111)
CO2: 28 MMOL/L (ref 21–31)
CREAT SERPL-MCNC: 0.75 MG/DL (ref 0.7–1.3)
EGFR (CKD-EPI): 92 ML/MIN/1.73 M2
EOSINOPHILS ABSOLUTE: 0.1 THOU/MCL (ref 0.03–0.45)
EOSINOPHILS RELATIVE PERCENT: 2 %
GLUCOSE BLD-MCNC: 221 MG/DL (ref 70–99)
HCT VFR BLD CALC: 44 % (ref 40–50)
HEMOGLOBIN: 14.6 G/DL (ref 13.5–16.5)
LYMPHOCYTES ABSOLUTE: 1 THOU/MCL (ref 1–4)
LYMPHOCYTES RELATIVE PERCENT: 21 %
MCH RBC QN AUTO: 29.4 PG (ref 27–33)
MCHC RBC AUTO-ENTMCNC: 33.3 G/DL (ref 32–36)
MCV RBC AUTO: 88.3 FL (ref 82–97)
MONOCYTES # BLD: 9 %
MONOCYTES ABSOLUTE: 0.5 THOU/MCL (ref 0.2–0.9)
NEUTROPHILS ABSOLUTE: 3.3 THOU/MCL (ref 1.8–7.7)
PDW BLD-RTO: 14 % (ref 12.3–17)
PLATELET # BLD: 205 THOU/MCL (ref 140–375)
PMV BLD AUTO: 8.6 FL (ref 7.4–11.5)
POTASSIUM SERPL-SCNC: 3.9 MEQ/L (ref 3.6–5.1)
RBC # BLD: 4.98 MIL/MCL (ref 4.4–5.8)
SEG NEUTROPHILS: 67 %
SODIUM BLD-SCNC: 137 MEQ/L (ref 135–145)
TOTAL PROTEIN: 7.4 G/DL (ref 6–8)
TSH ULTRASENSITIVE: 1.65 MCIU/ML (ref 0.27–4.2)
WBC # BLD: 4.9 THOU/MCL (ref 3.6–10.5)

## 2023-05-05 LAB
HB: SOURCE: NORMAL
IDENTIFICATION: NORMAL
LAB: NORMAL
Lab: 2 UG/L
Lab: NORMAL
TEST NAME: NORMAL

## 2023-05-19 PROBLEM — I50.22 CHRONIC HFREF (HEART FAILURE WITH REDUCED EJECTION FRACTION) (HCC): Status: ACTIVE | Noted: 2023-05-19

## 2023-05-22 RX ORDER — APIXABAN 5 MG/1
TABLET, FILM COATED ORAL
Qty: 180 TABLET | Refills: 3 | OUTPATIENT
Start: 2023-05-22

## 2023-05-31 ENCOUNTER — TELEPHONE (OUTPATIENT)
Dept: CARDIOLOGY CLINIC | Age: 78
End: 2023-05-31

## 2023-05-31 NOTE — TELEPHONE ENCOUNTER
Last OV: 4/22/22 with Dr Jessica Ruggiero. Patient recently saw [facility]  Next Not scheduled. Patient following [facility]. Last refill:   Most recent Labs:   Last EKG (if needed):    Called patient to confirm. Patient states that he was going to [facility] before seeing Dr. Jessica Ruggiero and went back there and is staying there. I requested that he call [facility] to refill his Eliquis since he is following them and plans on following them.

## 2023-05-31 NOTE — TELEPHONE ENCOUNTER
Martin Jimenez from [employer] called requesting a new Rx request for [employer].       Medication Refill    Medication needing refilled:  apixaban (ELIQUIS)    Dosage of the medication:  5 MG TABS tablet     How are you taking this medication (QD, BID, TID, QID, PRN):  Take 1 tablet by mouth 2 times daily    30 or 90 day supply called in: 90 day supply     When will you run out of your medication:  New Rx    Which Pharmacy are we sending the medication to?:    [address], [place] [address] [facility] [phone]

## 2024-03-25 PROBLEM — I25.119 ATHEROSCLEROTIC HEART DISEASE OF NATIVE CORONARY ARTERY WITH UNSPECIFIED ANGINA PECTORIS (HCC): Status: ACTIVE | Noted: 2024-03-25

## 2024-03-25 PROBLEM — I20.9 ANGINA PECTORIS, UNSPECIFIED (HCC): Status: ACTIVE | Noted: 2024-03-25

## 2024-05-23 ENCOUNTER — HOSPITAL ENCOUNTER (INPATIENT)
Age: 79
LOS: 2 days | Discharge: HOME | End: 2024-05-25
Attending: EMERGENCY MEDICINE | Admitting: INTERNAL MEDICINE
Payer: MEDICARE

## 2024-05-23 ENCOUNTER — APPOINTMENT (OUTPATIENT)
Dept: CT IMAGING | Age: 79
End: 2024-05-23
Payer: MEDICARE

## 2024-05-23 ENCOUNTER — APPOINTMENT (OUTPATIENT)
Dept: GENERAL RADIOLOGY | Age: 79
End: 2024-05-23
Payer: MEDICARE

## 2024-05-23 DIAGNOSIS — I95.1 ORTHOSTATIC HYPOTENSION: ICD-10-CM

## 2024-05-23 DIAGNOSIS — K04.7 DENTAL INFECTION: ICD-10-CM

## 2024-05-23 DIAGNOSIS — R55 SYNCOPE, UNSPECIFIED SYNCOPE TYPE: ICD-10-CM

## 2024-05-23 DIAGNOSIS — R55 NEAR SYNCOPE: Primary | ICD-10-CM

## 2024-05-23 DIAGNOSIS — R79.89 ELEVATED TROPONIN: ICD-10-CM

## 2024-05-23 DIAGNOSIS — Z71.89 GOALS OF CARE, COUNSELING/DISCUSSION: ICD-10-CM

## 2024-05-23 PROBLEM — K62.5 BRIGHT RED RECTAL BLEEDING: Status: RESOLVED | Noted: 2022-03-15 | Resolved: 2024-05-23

## 2024-05-23 PROBLEM — I50.22 CHRONIC HFREF (HEART FAILURE WITH REDUCED EJECTION FRACTION) (HCC): Status: RESOLVED | Noted: 2023-05-19 | Resolved: 2024-05-23

## 2024-05-23 PROBLEM — K12.2 INFECTION OF MOUTH: Status: ACTIVE | Noted: 2024-05-23

## 2024-05-23 PROBLEM — E87.20 LACTIC ACIDOSIS: Status: ACTIVE | Noted: 2024-05-23

## 2024-05-23 PROBLEM — D72.9 NEUTROPHILIC LEUKOCYTOSIS: Status: ACTIVE | Noted: 2024-05-23

## 2024-05-23 PROBLEM — I95.9 TRANSIENT HYPOTENSION: Status: ACTIVE | Noted: 2024-05-23

## 2024-05-23 PROBLEM — R68.89 RIGORS: Status: ACTIVE | Noted: 2024-05-23

## 2024-05-23 LAB
ALBUMIN SERPL-MCNC: 3.8 G/DL (ref 3.4–5)
ALBUMIN/GLOB SERPL: 1.4 {RATIO} (ref 1.1–2.2)
ALP SERPL-CCNC: 54 U/L (ref 40–129)
ALT SERPL-CCNC: 11 U/L (ref 10–40)
ANION GAP SERPL CALCULATED.3IONS-SCNC: 12 MMOL/L (ref 3–16)
AST SERPL-CCNC: 15 U/L (ref 15–37)
BACTERIA URNS QL MICRO: NORMAL /HPF
BASOPHILS # BLD: 0 K/UL (ref 0–0.2)
BASOPHILS NFR BLD: 0.4 %
BILIRUB SERPL-MCNC: 0.5 MG/DL (ref 0–1)
BILIRUB UR QL STRIP.AUTO: NEGATIVE
BUN SERPL-MCNC: 14 MG/DL (ref 7–20)
CALCIUM SERPL-MCNC: 9 MG/DL (ref 8.3–10.6)
CHLORIDE SERPL-SCNC: 101 MMOL/L (ref 99–110)
CLARITY UR: CLEAR
CO2 SERPL-SCNC: 25 MMOL/L (ref 21–32)
COLOR UR: YELLOW
CREAT SERPL-MCNC: 0.9 MG/DL (ref 0.8–1.3)
DEPRECATED RDW RBC AUTO: 14.7 % (ref 12.4–15.4)
EKG ATRIAL RATE: 74 BPM
EKG DIAGNOSIS: NORMAL
EKG P AXIS: -19 DEGREES
EKG P-R INTERVAL: 142 MS
EKG Q-T INTERVAL: 402 MS
EKG QRS DURATION: 146 MS
EKG QTC CALCULATION (BAZETT): 446 MS
EKG R AXIS: 114 DEGREES
EKG T AXIS: 44 DEGREES
EKG VENTRICULAR RATE: 74 BPM
EOSINOPHIL # BLD: 0 K/UL (ref 0–0.6)
EOSINOPHIL NFR BLD: 0 %
EPI CELLS #/AREA URNS AUTO: 1 /HPF (ref 0–5)
GFR SERPLBLD CREATININE-BSD FMLA CKD-EPI: 86 ML/MIN/{1.73_M2}
GLUCOSE BLD-MCNC: 142 MG/DL (ref 70–99)
GLUCOSE SERPL-MCNC: 147 MG/DL (ref 70–99)
GLUCOSE UR STRIP.AUTO-MCNC: >=1000 MG/DL
HCT VFR BLD AUTO: 39.8 % (ref 40.5–52.5)
HGB BLD-MCNC: 13.1 G/DL (ref 13.5–17.5)
HGB UR QL STRIP.AUTO: NEGATIVE
HYALINE CASTS #/AREA URNS AUTO: 4 /LPF (ref 0–8)
KETONES UR STRIP.AUTO-MCNC: ABNORMAL MG/DL
LACTATE BLDV-SCNC: 1.8 MMOL/L (ref 0.4–1.9)
LACTATE BLDV-SCNC: 2.7 MMOL/L (ref 0.4–1.9)
LEUKOCYTE ESTERASE UR QL STRIP.AUTO: NEGATIVE
LYMPHOCYTES # BLD: 0.6 K/UL (ref 1–5.1)
LYMPHOCYTES NFR BLD: 4.5 %
MCH RBC QN AUTO: 29.4 PG (ref 26–34)
MCHC RBC AUTO-ENTMCNC: 32.9 G/DL (ref 31–36)
MCV RBC AUTO: 89.3 FL (ref 80–100)
MONOCYTES # BLD: 0.9 K/UL (ref 0–1.3)
MONOCYTES NFR BLD: 6.6 %
NEUTROPHILS # BLD: 12.1 K/UL (ref 1.7–7.7)
NEUTROPHILS NFR BLD: 88.5 %
NITRITE UR QL STRIP.AUTO: NEGATIVE
NT-PROBNP SERPL-MCNC: 945 PG/ML (ref 0–449)
PERFORMED ON: ABNORMAL
PH UR STRIP.AUTO: 5.5 [PH] (ref 5–8)
PLATELET # BLD AUTO: 193 K/UL (ref 135–450)
PMV BLD AUTO: 9.2 FL (ref 5–10.5)
POTASSIUM SERPL-SCNC: 3.8 MMOL/L (ref 3.5–5.1)
PROCALCITONIN SERPL IA-MCNC: 0.33 NG/ML (ref 0–0.15)
PROT SERPL-MCNC: 6.5 G/DL (ref 6.4–8.2)
PROT UR STRIP.AUTO-MCNC: 30 MG/DL
RBC # BLD AUTO: 4.45 M/UL (ref 4.2–5.9)
RBC CLUMPS #/AREA URNS AUTO: 4 /HPF (ref 0–4)
SODIUM SERPL-SCNC: 138 MMOL/L (ref 136–145)
SP GR UR STRIP.AUTO: 1.05 (ref 1–1.03)
TROPONIN, HIGH SENSITIVITY: 67 NG/L (ref 0–22)
TROPONIN, HIGH SENSITIVITY: 76 NG/L (ref 0–22)
UA COMPLETE W REFLEX CULTURE PNL UR: ABNORMAL
UA DIPSTICK W REFLEX MICRO PNL UR: YES
URN SPEC COLLECT METH UR: ABNORMAL
UROBILINOGEN UR STRIP-ACNC: 1 E.U./DL
WBC # BLD AUTO: 13.7 K/UL (ref 4–11)
WBC #/AREA URNS AUTO: 1 /HPF (ref 0–5)

## 2024-05-23 PROCEDURE — 85025 COMPLETE CBC W/AUTO DIFF WBC: CPT

## 2024-05-23 PROCEDURE — 83605 ASSAY OF LACTIC ACID: CPT

## 2024-05-23 PROCEDURE — 71045 X-RAY EXAM CHEST 1 VIEW: CPT

## 2024-05-23 PROCEDURE — 84484 ASSAY OF TROPONIN QUANT: CPT

## 2024-05-23 PROCEDURE — 84145 PROCALCITONIN (PCT): CPT

## 2024-05-23 PROCEDURE — 81001 URINALYSIS AUTO W/SCOPE: CPT

## 2024-05-23 PROCEDURE — 2580000003 HC RX 258: Performed by: EMERGENCY MEDICINE

## 2024-05-23 PROCEDURE — 99285 EMERGENCY DEPT VISIT HI MDM: CPT

## 2024-05-23 PROCEDURE — 2060000000 HC ICU INTERMEDIATE R&B

## 2024-05-23 PROCEDURE — 6360000002 HC RX W HCPCS: Performed by: INTERNAL MEDICINE

## 2024-05-23 PROCEDURE — 93010 ELECTROCARDIOGRAM REPORT: CPT | Performed by: INTERNAL MEDICINE

## 2024-05-23 PROCEDURE — 6360000004 HC RX CONTRAST MEDICATION

## 2024-05-23 PROCEDURE — 2580000003 HC RX 258: Performed by: INTERNAL MEDICINE

## 2024-05-23 PROCEDURE — 71260 CT THORAX DX C+: CPT

## 2024-05-23 PROCEDURE — 83880 ASSAY OF NATRIURETIC PEPTIDE: CPT

## 2024-05-23 PROCEDURE — 6370000000 HC RX 637 (ALT 250 FOR IP): Performed by: INTERNAL MEDICINE

## 2024-05-23 PROCEDURE — 96360 HYDRATION IV INFUSION INIT: CPT

## 2024-05-23 PROCEDURE — 87040 BLOOD CULTURE FOR BACTERIA: CPT

## 2024-05-23 PROCEDURE — 93005 ELECTROCARDIOGRAM TRACING: CPT | Performed by: EMERGENCY MEDICINE

## 2024-05-23 PROCEDURE — 36415 COLL VENOUS BLD VENIPUNCTURE: CPT

## 2024-05-23 PROCEDURE — 80053 COMPREHEN METABOLIC PANEL: CPT

## 2024-05-23 RX ORDER — ACETAMINOPHEN 325 MG/1
650 TABLET ORAL EVERY 6 HOURS PRN
Status: DISCONTINUED | OUTPATIENT
Start: 2024-05-23 | End: 2024-05-25 | Stop reason: HOSPADM

## 2024-05-23 RX ORDER — ONDANSETRON 4 MG/1
4 TABLET, ORALLY DISINTEGRATING ORAL EVERY 8 HOURS PRN
Status: DISCONTINUED | OUTPATIENT
Start: 2024-05-23 | End: 2024-05-25 | Stop reason: HOSPADM

## 2024-05-23 RX ORDER — POLYETHYLENE GLYCOL 3350 17 G/17G
17 POWDER, FOR SOLUTION ORAL DAILY PRN
Status: DISCONTINUED | OUTPATIENT
Start: 2024-05-23 | End: 2024-05-25 | Stop reason: HOSPADM

## 2024-05-23 RX ORDER — 0.9 % SODIUM CHLORIDE 0.9 %
1000 INTRAVENOUS SOLUTION INTRAVENOUS ONCE
Status: COMPLETED | OUTPATIENT
Start: 2024-05-23 | End: 2024-05-23

## 2024-05-23 RX ORDER — ISOSORBIDE MONONITRATE 30 MG/1
30 TABLET, EXTENDED RELEASE ORAL DAILY
Status: DISCONTINUED | OUTPATIENT
Start: 2024-05-24 | End: 2024-05-25 | Stop reason: HOSPADM

## 2024-05-23 RX ORDER — MAGNESIUM SULFATE IN WATER 40 MG/ML
2000 INJECTION, SOLUTION INTRAVENOUS PRN
Status: DISCONTINUED | OUTPATIENT
Start: 2024-05-23 | End: 2024-05-25 | Stop reason: HOSPADM

## 2024-05-23 RX ORDER — POTASSIUM CHLORIDE 20 MEQ/1
40 TABLET, EXTENDED RELEASE ORAL PRN
Status: DISCONTINUED | OUTPATIENT
Start: 2024-05-23 | End: 2024-05-25 | Stop reason: HOSPADM

## 2024-05-23 RX ORDER — SODIUM CHLORIDE 0.9 % (FLUSH) 0.9 %
5-40 SYRINGE (ML) INJECTION EVERY 12 HOURS SCHEDULED
Status: DISCONTINUED | OUTPATIENT
Start: 2024-05-23 | End: 2024-05-25 | Stop reason: HOSPADM

## 2024-05-23 RX ORDER — PANTOPRAZOLE SODIUM 40 MG/1
40 TABLET, DELAYED RELEASE ORAL
Status: DISCONTINUED | OUTPATIENT
Start: 2024-05-24 | End: 2024-05-25 | Stop reason: HOSPADM

## 2024-05-23 RX ORDER — SODIUM CHLORIDE 9 MG/ML
INJECTION, SOLUTION INTRAVENOUS CONTINUOUS
Status: DISCONTINUED | OUTPATIENT
Start: 2024-05-23 | End: 2024-05-24

## 2024-05-23 RX ORDER — TAMSULOSIN HYDROCHLORIDE 0.4 MG/1
0.4 CAPSULE ORAL NIGHTLY
Status: DISCONTINUED | OUTPATIENT
Start: 2024-05-23 | End: 2024-05-25 | Stop reason: HOSPADM

## 2024-05-23 RX ORDER — POTASSIUM CHLORIDE 7.45 MG/ML
10 INJECTION INTRAVENOUS PRN
Status: DISCONTINUED | OUTPATIENT
Start: 2024-05-23 | End: 2024-05-25 | Stop reason: HOSPADM

## 2024-05-23 RX ORDER — ACETAMINOPHEN 650 MG/1
650 SUPPOSITORY RECTAL EVERY 6 HOURS PRN
Status: DISCONTINUED | OUTPATIENT
Start: 2024-05-23 | End: 2024-05-25 | Stop reason: HOSPADM

## 2024-05-23 RX ORDER — INSULIN LISPRO 100 [IU]/ML
0-4 INJECTION, SOLUTION INTRAVENOUS; SUBCUTANEOUS NIGHTLY
Status: DISCONTINUED | OUTPATIENT
Start: 2024-05-23 | End: 2024-05-25 | Stop reason: HOSPADM

## 2024-05-23 RX ORDER — SODIUM CHLORIDE 0.9 % (FLUSH) 0.9 %
5-40 SYRINGE (ML) INJECTION PRN
Status: DISCONTINUED | OUTPATIENT
Start: 2024-05-23 | End: 2024-05-25 | Stop reason: HOSPADM

## 2024-05-23 RX ORDER — INSULIN LISPRO 100 [IU]/ML
0-8 INJECTION, SOLUTION INTRAVENOUS; SUBCUTANEOUS
Status: DISCONTINUED | OUTPATIENT
Start: 2024-05-24 | End: 2024-05-25 | Stop reason: HOSPADM

## 2024-05-23 RX ORDER — ONDANSETRON 2 MG/ML
4 INJECTION INTRAMUSCULAR; INTRAVENOUS EVERY 6 HOURS PRN
Status: DISCONTINUED | OUTPATIENT
Start: 2024-05-23 | End: 2024-05-25 | Stop reason: HOSPADM

## 2024-05-23 RX ORDER — SODIUM CHLORIDE 9 MG/ML
INJECTION, SOLUTION INTRAVENOUS PRN
Status: DISCONTINUED | OUTPATIENT
Start: 2024-05-23 | End: 2024-05-25 | Stop reason: HOSPADM

## 2024-05-23 RX ORDER — PRAVASTATIN SODIUM 40 MG
40 TABLET ORAL DAILY
Status: DISCONTINUED | OUTPATIENT
Start: 2024-05-24 | End: 2024-05-25 | Stop reason: HOSPADM

## 2024-05-23 RX ADMIN — PIPERACILLIN AND TAZOBACTAM 3375 MG: 3; .375 INJECTION, POWDER, LYOPHILIZED, FOR SOLUTION INTRAVENOUS at 23:35

## 2024-05-23 RX ADMIN — TAMSULOSIN HYDROCHLORIDE 0.4 MG: 0.4 CAPSULE ORAL at 20:55

## 2024-05-23 RX ADMIN — SODIUM CHLORIDE 1000 ML: 9 INJECTION, SOLUTION INTRAVENOUS at 11:26

## 2024-05-23 RX ADMIN — PIPERACILLIN AND TAZOBACTAM 4500 MG: 4; .5 INJECTION, POWDER, LYOPHILIZED, FOR SOLUTION INTRAVENOUS at 16:31

## 2024-05-23 RX ADMIN — IOPAMIDOL 75 ML: 755 INJECTION, SOLUTION INTRAVENOUS at 14:11

## 2024-05-23 RX ADMIN — SODIUM CHLORIDE, PRESERVATIVE FREE 10 ML: 5 INJECTION INTRAVENOUS at 19:47

## 2024-05-23 RX ADMIN — APIXABAN 5 MG: 5 TABLET, FILM COATED ORAL at 20:55

## 2024-05-23 RX ADMIN — SODIUM CHLORIDE: 9 INJECTION, SOLUTION INTRAVENOUS at 19:47

## 2024-05-23 RX ADMIN — SODIUM CHLORIDE: 9 INJECTION, SOLUTION INTRAVENOUS at 23:34

## 2024-05-23 ASSESSMENT — PAIN SCALES - GENERAL
PAINLEVEL_OUTOF10: 0
PAINLEVEL_OUTOF10: 0

## 2024-05-23 NOTE — ED PROVIDER NOTES
[facility] EMERGENCY DEPARTMENT  EMERGENCY DEPARTMENT ENCOUNTER        Pt Name: Yasir Bojorquez  MRN: [id number]  Birthdate 1945  Date of evaluation: 5/23/2024  Provider: RHETT Vazquez - CNP  PCP: Snuday Jarquin MD  Note Started: 11:06 AM EDT 5/23/24       I have seen and evaluated this patient with my supervising physician Mikey Verde MD.      CHIEF COMPLAINT       Chief Complaint   Patient presents with    Loss of Consciousness     Pt in via EMS from PCP office after having \"brief syncopal episode\" while going from sitting to standing position. Pt states that blood pressure was \"low\" prior to standing. Pt states that blood pressure was \"90/40\" prior to standing. Pt alert and oriented at this time with no signs of distress noted. VSS in triage. Pt received approximately \"200 ml of fluids en route\" per EMS.       HISTORY OF PRESENT ILLNESS: 1 or more Elements     History From: Patient, family at bedside, phone call from patient's PCP     Limitations to history : None    Social Determinants Significantly Affecting Health : None    Chief Complaint: Above    Yasir Bojorquez is a 78 y.o. male who  has a past medical history of Arthritis, Hyperlipidemia, Hypertension, Prostate cancer (HCC), Sleep apnea, Spinal stenosis, and Type II or unspecified type diabetes mellitus without mention of complication, not stated as uncontrolled.  presents to ED for near syncopal episode at PCP office.  EMS called.  Received 200 cc IV fluid.  Patient also with right upper dental pain and on amoxicillin.  No fevers or chills.  Patient denies chest pain or feeling lightheaded.  At the time my exam patient states he feels \"fine\"  The patient  reports that he quit smoking about 44 years ago. His smoking use included cigarettes. He started smoking about 59 years ago. He has a 3.8 pack-year smoking history. He has never used smokeless tobacco. He reports current alcohol use. He reports that he

## 2024-05-23 NOTE — ED NOTES
Pharmacy Medication Reconciliation Note     List of medications patient is currently taking is complete.    Source of information:   EMR  Patient         Yoav Culver PharmD  5/23/2024  5:22 PM

## 2024-05-23 NOTE — ED PROVIDER NOTES
ED Attending Attestation Note    I personally saw the patient and made/approved the management plan and take responsibility for patient management.     Briefly, 78 y.o. male presents for evaluation after syncopal versus near syncopal event that occurred with postural change in PCP office. I rec'd a call-ahead from Dr. Jarquin who referred patient to ED. Reports the above episode. In addition, notes recently prescribed abx for \"dental infection\" and complained of chills yesterday. The patient complains of chronic fatigue and dyspnea. No chest pain.      Focused exam:   Gen: 78 y.o. male, NAD  HEENT: NCAT, no facial edema, managing secretions easily  CV: RRR, no MRG  Lungs: CTAB, no incr WOB  Neuro: no periph edema    Imaging:   XR CHEST PORTABLE   Final Result   No acute process.            EKG interpreted independently by myself.   Rate: 74  Rhythm: NSR  Axis: 114  Intervals:   QTc 446  ST Segments: no acute abnormality  T waves: Nonspecific T abnormality   Comparison: Compared to 10/10/23, there is no significant change  Impression: NSR with RBBB, Nonspecific T abnormality       MDM:   This is an 78-year-old male presenting after a syncopal or near syncopal event earlier today.  He complains of chills after dental procedure yesterday.  Here, he is afebrile with otherwise reassuring vital signs.  His CBC does show a leukocytosis however he does not meet SIRS criteria.  Nevertheless, blood cultures were obtained.  He is on oral antibiotics for a dental infection and I think at this juncture it is reasonable to continue the oral antibiotics as opposed to escalate to IV.Chest x-ray no acute infiltrate to my independent interpretation.  Renal panel no significant electrolyte abnormality or creatinine elevation.  High-sensitivity troponin is elevated at 76.  The patient is chest pain-free and EKG shows no significant ST segment deviations.  Downtrending on repeat.  Presentation inconsistent with ACS.  Will

## 2024-05-24 PROBLEM — I25.10 CORONARY ARTERY DISEASE INVOLVING NATIVE CORONARY ARTERY OF NATIVE HEART WITHOUT ANGINA PECTORIS: Status: ACTIVE | Noted: 2024-03-25

## 2024-05-24 LAB
ANION GAP SERPL CALCULATED.3IONS-SCNC: 11 MMOL/L (ref 3–16)
BACTERIA BLD CULT ORG #2: NORMAL
BACTERIA BLD CULT: NORMAL
BASOPHILS # BLD: 0 K/UL (ref 0–0.2)
BASOPHILS NFR BLD: 0.7 %
BUN SERPL-MCNC: 12 MG/DL (ref 7–20)
CALCIUM SERPL-MCNC: 8.3 MG/DL (ref 8.3–10.6)
CHLORIDE SERPL-SCNC: 102 MMOL/L (ref 99–110)
CO2 SERPL-SCNC: 24 MMOL/L (ref 21–32)
CREAT SERPL-MCNC: 0.5 MG/DL (ref 0.8–1.3)
DEPRECATED RDW RBC AUTO: 14 % (ref 12.4–15.4)
EOSINOPHIL # BLD: 0 K/UL (ref 0–0.6)
EOSINOPHIL NFR BLD: 0.4 %
GFR SERPLBLD CREATININE-BSD FMLA CKD-EPI: >90 ML/MIN/{1.73_M2}
GLUCOSE BLD-MCNC: 107 MG/DL (ref 70–99)
GLUCOSE BLD-MCNC: 107 MG/DL (ref 70–99)
GLUCOSE BLD-MCNC: 124 MG/DL (ref 70–99)
GLUCOSE BLD-MCNC: 96 MG/DL (ref 70–99)
GLUCOSE SERPL-MCNC: 107 MG/DL (ref 70–99)
HCT VFR BLD AUTO: 36.6 % (ref 40.5–52.5)
HGB BLD-MCNC: 12.7 G/DL (ref 13.5–17.5)
LACTATE BLDV-SCNC: 0.7 MMOL/L (ref 0.4–2)
LYMPHOCYTES # BLD: 0.7 K/UL (ref 1–5.1)
LYMPHOCYTES NFR BLD: 13 %
MCH RBC QN AUTO: 30.3 PG (ref 26–34)
MCHC RBC AUTO-ENTMCNC: 34.6 G/DL (ref 31–36)
MCV RBC AUTO: 87.7 FL (ref 80–100)
MONOCYTES # BLD: 0.4 K/UL (ref 0–1.3)
MONOCYTES NFR BLD: 8.4 %
NEUTROPHILS # BLD: 4 K/UL (ref 1.7–7.7)
NEUTROPHILS NFR BLD: 77.5 %
PERFORMED ON: ABNORMAL
PERFORMED ON: NORMAL
PLATELET # BLD AUTO: 149 K/UL (ref 135–450)
PMV BLD AUTO: 8.7 FL (ref 5–10.5)
POTASSIUM SERPL-SCNC: 3.7 MMOL/L (ref 3.5–5.1)
RBC # BLD AUTO: 4.18 M/UL (ref 4.2–5.9)
SODIUM SERPL-SCNC: 137 MMOL/L (ref 136–145)
WBC # BLD AUTO: 5.1 K/UL (ref 4–11)

## 2024-05-24 PROCEDURE — 6360000002 HC RX W HCPCS: Performed by: INTERNAL MEDICINE

## 2024-05-24 PROCEDURE — 2060000000 HC ICU INTERMEDIATE R&B

## 2024-05-24 PROCEDURE — 99223 1ST HOSP IP/OBS HIGH 75: CPT | Performed by: INTERNAL MEDICINE

## 2024-05-24 PROCEDURE — 36415 COLL VENOUS BLD VENIPUNCTURE: CPT

## 2024-05-24 PROCEDURE — 6370000000 HC RX 637 (ALT 250 FOR IP): Performed by: INTERNAL MEDICINE

## 2024-05-24 PROCEDURE — 2580000003 HC RX 258: Performed by: INTERNAL MEDICINE

## 2024-05-24 PROCEDURE — 94760 N-INVAS EAR/PLS OXIMETRY 1: CPT

## 2024-05-24 PROCEDURE — 97116 GAIT TRAINING THERAPY: CPT | Performed by: PHYSICAL THERAPIST

## 2024-05-24 PROCEDURE — 85025 COMPLETE CBC W/AUTO DIFF WBC: CPT

## 2024-05-24 PROCEDURE — 83605 ASSAY OF LACTIC ACID: CPT

## 2024-05-24 PROCEDURE — 97161 PT EVAL LOW COMPLEX 20 MIN: CPT | Performed by: PHYSICAL THERAPIST

## 2024-05-24 PROCEDURE — 80048 BASIC METABOLIC PNL TOTAL CA: CPT

## 2024-05-24 PROCEDURE — 97530 THERAPEUTIC ACTIVITIES: CPT | Performed by: PHYSICAL THERAPIST

## 2024-05-24 RX ORDER — GLUCAGON 1 MG/ML
1 KIT INJECTION PRN
Status: DISCONTINUED | OUTPATIENT
Start: 2024-05-24 | End: 2024-05-25 | Stop reason: HOSPADM

## 2024-05-24 RX ORDER — LISINOPRIL 10 MG/1
10 TABLET ORAL DAILY
Status: DISCONTINUED | OUTPATIENT
Start: 2024-05-24 | End: 2024-05-25 | Stop reason: HOSPADM

## 2024-05-24 RX ORDER — DEXTROSE MONOHYDRATE 100 MG/ML
INJECTION, SOLUTION INTRAVENOUS CONTINUOUS PRN
Status: DISCONTINUED | OUTPATIENT
Start: 2024-05-24 | End: 2024-05-25 | Stop reason: HOSPADM

## 2024-05-24 RX ADMIN — ISOSORBIDE MONONITRATE 30 MG: 30 TABLET, EXTENDED RELEASE ORAL at 08:48

## 2024-05-24 RX ADMIN — PRAVASTATIN SODIUM 40 MG: 40 TABLET ORAL at 08:47

## 2024-05-24 RX ADMIN — PANTOPRAZOLE SODIUM 40 MG: 40 TABLET, DELAYED RELEASE ORAL at 07:15

## 2024-05-24 RX ADMIN — APIXABAN 5 MG: 5 TABLET, FILM COATED ORAL at 20:33

## 2024-05-24 RX ADMIN — PIPERACILLIN AND TAZOBACTAM 3375 MG: 3; .375 INJECTION, POWDER, LYOPHILIZED, FOR SOLUTION INTRAVENOUS at 23:32

## 2024-05-24 RX ADMIN — LISINOPRIL 10 MG: 10 TABLET ORAL at 08:49

## 2024-05-24 RX ADMIN — PIPERACILLIN AND TAZOBACTAM 3375 MG: 3; .375 INJECTION, POWDER, LYOPHILIZED, FOR SOLUTION INTRAVENOUS at 16:24

## 2024-05-24 RX ADMIN — SODIUM CHLORIDE, PRESERVATIVE FREE 10 ML: 5 INJECTION INTRAVENOUS at 20:34

## 2024-05-24 RX ADMIN — TAMSULOSIN HYDROCHLORIDE 0.4 MG: 0.4 CAPSULE ORAL at 20:33

## 2024-05-24 RX ADMIN — VERAPAMIL HYDROCHLORIDE 180 MG: 180 TABLET, FILM COATED, EXTENDED RELEASE ORAL at 11:13

## 2024-05-24 RX ADMIN — EMPAGLIFLOZIN 10 MG: 10 TABLET, FILM COATED ORAL at 08:50

## 2024-05-24 RX ADMIN — PIPERACILLIN AND TAZOBACTAM 3375 MG: 3; .375 INJECTION, POWDER, LYOPHILIZED, FOR SOLUTION INTRAVENOUS at 07:17

## 2024-05-24 RX ADMIN — APIXABAN 5 MG: 5 TABLET, FILM COATED ORAL at 08:49

## 2024-05-24 ASSESSMENT — PAIN SCALES - GENERAL: PAINLEVEL_OUTOF10: 0

## 2024-05-24 NOTE — PROGRESS NOTES
4 Eyes Skin Assessment     NAME:  Yasir Bojorquez  YOB: 1945  MEDICAL RECORD NUMBER:  [id number]    The patient is being assessed for  Admission    I agree that at least one RN has performed a thorough Head to Toe Skin Assessment on the patient. ALL assessment sites listed below have been assessed.      Areas assessed by both nurses:    Head, Face, Ears, Shoulders, Back, Chest, Arms, Elbows, Hands, Sacrum. Buttock, Coccyx, Ischium, Legs. Feet and Heels, and Under Medical Devices         Does the Patient have a Wound? No noted wound(s)       Adelso Prevention initiated by RN: No  Wound Care Orders initiated by RN: No    Pressure Injury (Stage 3,4, Unstageable, DTI, NWPT, and Complex wounds) if present, place Wound referral order by RN under : No    New Ostomies, if present place, Ostomy referral order under : No     Nurse 1 eSignature: Electronically signed by Joann Neal RN on 5/24/24 at 6:19 AM EDT    **SHARE this note so that the co-signing nurse can place an eSignature**    Nurse 2 eSignature: Electronically signed by Jojo Dan RN on 5/24/24 at 6:19 AM EDT

## 2024-05-24 NOTE — H&P
1.0 - 5.1 K/uL    Monocytes Absolute 0.9 0.0 - 1.3 K/uL    Eosinophils Absolute 0.0 0.0 - 0.6 K/uL    Basophils Absolute 0.0 0.0 - 0.2 K/uL   CMP w/ Reflex to MG    Collection Time: 05/23/24 11:06 AM   Result Value Ref Range    Sodium 138 136 - 145 mmol/L    Potassium reflex Magnesium 3.8 3.5 - 5.1 mmol/L    Chloride 101 99 - 110 mmol/L    CO2 25 21 - 32 mmol/L    Anion Gap 12 3 - 16    Glucose 147 (H) 70 - 99 mg/dL    BUN 14 7 - 20 mg/dL    Creatinine 0.9 0.8 - 1.3 mg/dL    Est, Glom Filt Rate 86 >60    Calcium 9.0 8.3 - 10.6 mg/dL    Total Protein 6.5 6.4 - 8.2 g/dL    Albumin 3.8 3.4 - 5.0 g/dL    Albumin/Globulin Ratio 1.4 1.1 - 2.2    Total Bilirubin 0.5 0.0 - 1.0 mg/dL    Alkaline Phosphatase 54 40 - 129 U/L    ALT 11 10 - 40 U/L    AST 15 15 - 37 U/L   Troponin    Collection Time: 05/23/24 11:06 AM   Result Value Ref Range    Troponin, High Sensitivity 76 (H) 0 - 22 ng/L   Lactate, Sepsis    Collection Time: 05/23/24 11:06 AM   Result Value Ref Range    Lactic Acid, Sepsis 2.7 (H) 0.4 - 1.9 mmol/L   Brain Natriuretic Peptide    Collection Time: 05/23/24 11:06 AM   Result Value Ref Range    Pro- (H) 0 - 449 pg/mL   Procalcitonin    Collection Time: 05/23/24 11:06 AM   Result Value Ref Range    Procalcitonin 0.33 (H) 0.00 - 0.15 ng/mL   Troponin    Collection Time: 05/23/24 12:18 PM   Result Value Ref Range    Troponin, High Sensitivity 67 (H) 0 - 22 ng/L   Lactate, Sepsis    Collection Time: 05/23/24 12:18 PM   Result Value Ref Range    Lactic Acid, Sepsis 1.8 0.4 - 1.9 mmol/L   Urinalysis with Reflex to Culture    Collection Time: 05/23/24  2:50 PM    Specimen: Urine   Result Value Ref Range    Color, UA Yellow Straw/Yellow    Clarity, UA Clear Clear    Glucose, Ur >=1000 (A) Negative mg/dL    Bilirubin, Urine Negative Negative    Ketones, Urine TRACE (A) Negative mg/dL    Specific Gravity, UA 1.047 1.005 - 1.030    Blood, Urine Negative Negative    pH, Urine 5.5 5.0 - 8.0    Protein, UA 30 (A)  Value Ref Range    POC Glucose 107 (H) 70 - 99 mg/dl    Performed on ACCU-CHEK        ASSESSMENT:      Principal Problem:    Syncope due to orthostatic hypotension  Active Problems:    Transient hypotension    Lactic acidosis- resolved.    Infection of mouth    Elevated troponin    Rigors    Neutrophilic leukocytosis    Essential hypertension    Type 2 diabetes mellitus without complication, without long-term current use of insulin     Hyperlipidemia    History of Prostate cancer     PLAN:    I am fairly certain that the episode of syncope/presyncope was directly related to his low blood pressure yesterday.  The presence of the odontogenic infection with the rigors the night before infection to suggest the possibility of sepsis.  Along these lines the patient did present with an elevated white blood cell count and procalcitonin that was elevated as well.  On exam the tooth/gum does seem infected but not obviously an abscess.  His oral antibiotics have been switched empirically to IV Zosyn and blood cultures were obtained.  Fortunately he remains afebrile.  Metformin is on hold, he will be continued on his SGLT2 inhibitor, sliding scale insulin has been started.  IV fluids will be discontinued today due to his elevated blood pressure.  We will check orthostatics today and his verapamil and lisinopril will be restarted albeit with a lower lisinopril dose.  We plan to keep him for monitoring today and await his blood cultures.  Procalcitonin will be rechecked tomorrow morning along with a CBC.  The troponins are elevated, this is from an unknown cause as he has not had any chest pain.  An echocardiogram has been ordered as well.  Cardiology has been consulted.  The initial lactic acidosis was likely related to his transient hypotension, but metformin is on hold as well as noted above.  The lactic acidosis did resolve quickly yesterday.        HALIE HOYOS MD  8:21 AM  5/24/2024

## 2024-05-24 NOTE — CARE COORDINATION
Case Management Assessment  Initial Evaluation    Date/Time of Evaluation: 5/24/2024 1:45 PM  Assessment Completed by: Dorcas Renner RN    If patient is discharged prior to next notation, then this note serves as note for discharge by case management.    Patient Name: Yasir Bojorquez                   YOB: 1945  Diagnosis: Orthostatic hypotension [I95.1]  Syncope and collapse [R55]  Elevated troponin [R79.89]  Near syncope [R55]  Dental infection [K04.7]  Goals of care, counseling/discussion [Z71.89]                   Date / Time: 5/23/2024 10:44 AM    Patient Admission Status: Inpatient   Readmission Risk (Low < 19, Mod (19-27), High > 27): Readmission Risk Score: 9.1    Current PCP: Sunday Jarquin MD  PCP verified by CM? Yes    Chart Reviewed: Yes      History Provided by: Patient  Patient Orientation: Alert and Oriented    Patient Cognition: Alert    Hospitalization in the last 30 days (Readmission):  No    If yes, Readmission Assessment in  Navigator will be completed.    Advance Directives:      Code Status: Full Code   Patient's Primary Decision Maker is: Legal Next of Kin    Primary Decision Maker: MaydayamilkaFidel - Child - [phone]    Secondary Decision Maker: Shira Souza - Child - [phone]    Discharge Planning:    Patient lives with: Alone Type of Home: House  Primary Care Giver: Self  Patient Support Systems include: Family Members   Current Financial resources: Medicare  Current community resources: None  Current services prior to admission: None            Current DME:  None            Type of Home Care services:  None    ADLS  Prior functional level: Independent in ADLs/IADLs  Current functional level: Independent in ADLs/IADLs    PT AM-PAC: 22 /24  OT AM-PAC: 24 /24    Family can provide assistance at DC: Yes  Would you like Case Management to discuss the discharge plan with any other family members/significant others, and if so, who? No  Plans to Return to

## 2024-05-24 NOTE — PROGRESS NOTES
Physical Therapy  Facility/Department: [address] PROGRESSIVE CARE  Physical Therapy Initial Assessment    Name: Yasir Bojorquez  : 1945  MRN: [id number]  Date of Service: 2024    Discharge Recommendations:  Home with assist PRN   PT Equipment Recommendations  Equipment Needed: No      Yasir Bojorquez scored a 22/24 on the AM-PAC short mobility form. Current research shows that an AM-PAC score of 18 or greater is typically associated with a discharge to the patient's home setting.  At this time pt will be safe to return home without any further therapy.  Will follow while in hosp to decrease secondary effects of immobility.  Please see assessment section for further patient specific details.    If patient discharges prior to next session this note will serve as a discharge summary.  Please see below for the latest assessment towards goals.       Patient Diagnosis(es): The primary encounter diagnosis was Near syncope. Diagnoses of Dental infection, Orthostatic hypotension, Elevated troponin, Goals of care, counseling/discussion, and Syncope, unspecified syncope type were also pertinent to this visit.  Past Medical History:  has a past medical history of Arthritis, Hyperlipidemia, Hypertension, Prostate cancer (HCC), Sleep apnea, Spinal stenosis, and Type II or unspecified type diabetes mellitus without mention of complication, not stated as uncontrolled.  Past Surgical History:  has a past surgical history that includes Cholecystectomy (); Foot surgery (); Rotator cuff repair (Bilateral); Total hip arthroplasty (2007); Jhony-en-Y Gastric Bypass (2012); Prostate surgery ( approx); TURP (2014); joint replacement; Colonoscopy (2014); Colonoscopy (08/15/2017); shoulder surgery (Bilateral); Colonoscopy (2021); Esophagogastroduodenoscopy (2023); and Colonoscopy (2023).    Assessment   Assessment: pt is an 77 yo male who was adm to hosp after having a syncopal

## 2024-05-24 NOTE — PROGRESS NOTES
Occupational Therapy  Facility/Department: [address] PROGRESSIVE CARE  Occupational Therapy Initial Assessment    Name: Yasir Bojorquez  : 1945  MRN: [id number]  Date of Service: 2024    Discharge Recommendations:  Home with assist PRN     Yasir Bojorquez scored a  on the AM-[facility] ADL Inpatient form.  At this time, no further OT is recommended upon discharge due to patient functioning close to baseline.  Recommend patient returns to prior setting with prior services.        Patient Diagnosis(es): The primary encounter diagnosis was Near syncope. Diagnoses of Dental infection, Orthostatic hypotension, Elevated troponin, Goals of care, counseling/discussion, and Syncope, unspecified syncope type were also pertinent to this visit.  Past Medical History:  has a past medical history of Arthritis, Hyperlipidemia, Hypertension, Prostate cancer (HCC), Sleep apnea, Spinal stenosis, and Type II or unspecified type diabetes mellitus without mention of complication, not stated as uncontrolled.  Past Surgical History:  has a past surgical history that includes Cholecystectomy (); Foot surgery (); Rotator cuff repair (Bilateral); Total hip arthroplasty (2007); Jhony-en-Y Gastric Bypass (2012); Prostate surgery ( approx); TURP (2014); joint replacement; Colonoscopy (2014); Colonoscopy (08/15/2017); shoulder surgery (Bilateral); Colonoscopy (2021); Esophagogastroduodenoscopy (2023); and Colonoscopy (2023).    Assessment   Assessment: Per ED Provider note on 24, \"Yasir Bojorquez is a 78 y.o. male who has a past medical history of Arthritis, Hyperlipidemia, Hypertension, Prostate cancer (HCC), Sleep apnea, Spinal stenosis, and Type II or unspecified type diabetes mellitus without mention of complication, not stated as uncontrolled. presents to ED for near syncopal episode at PCP office. EMS called.\" PTA, pt living alone at home where he is typically independent

## 2024-05-24 NOTE — FLOWSHEET NOTE
05/24/24 0800   Vital Signs   Orthostatic B/P and Pulse? Yes   Blood Pressure Lying 183/105   Pulse Lying 76 PER MINUTE   Blood Pressure Sitting 168/89   Pulse Sitting 79 PER MINUTE   Blood Pressure Standing 153/89   Pulse Standing 90 PER MINUTE     Pt was able to get OOB and ambulate around room and to the bathroom without any complications. Steady gait. Denies being dizzy or lightheaded.

## 2024-05-24 NOTE — CONSULTS
lesions.   Pysch: Normal mood and affect. Alert and oriented x 4.   Neurologic: Normal gross motor and sensory exam.       Labs     CBC:   Lab Results   Component Value Date/Time    WBC 5.1 05/24/2024 04:55 AM    RBC 4.18 05/24/2024 04:55 AM    HGB 12.7 05/24/2024 04:55 AM    HCT 36.6 05/24/2024 04:55 AM    MCV 87.7 05/24/2024 04:55 AM    RDW 14.0 05/24/2024 04:55 AM     05/24/2024 04:55 AM     CMP:  Lab Results   Component Value Date/Time     05/24/2024 04:54 AM    K 3.7 05/24/2024 04:54 AM     05/24/2024 04:54 AM    CO2 24 05/24/2024 04:54 AM    BUN 12 05/24/2024 04:54 AM    CREATININE 0.5 05/24/2024 04:54 AM    GFRAA >60 03/16/2022 04:13 AM    GFRAA >60 06/05/2013 08:18 AM    AGRATIO 1.4 05/23/2024 11:06 AM    LABGLOM >90 05/24/2024 04:54 AM    LABGLOM >60 03/07/2024 12:27 PM    GLUCOSE 107 05/24/2024 04:54 AM    GLUCOSE 162 06/23/2011 09:15 AM    PROT 7.2 10/26/2012 01:25 PM    CALCIUM 8.3 05/24/2024 04:54 AM    BILITOT 0.5 05/23/2024 11:06 AM    ALKPHOS 54 05/23/2024 11:06 AM    AST 15 05/23/2024 11:06 AM    ALT 11 05/23/2024 11:06 AM     PT/INR:  No results found for: \"PTINR\"  HgBA1c:  Lab Results   Component Value Date    LABA1C 6.5 03/07/2024     No results found for: \"CKTOTAL\", \"CKMB\", \"CKMBINDEX\", \"TROPONINI\"    Cardiac Data     EKG: Personally interpreted.  5/23/2024.  Normal sinus rhythm.  Right bundle branch block.  Nonspecific ST abnormality.    Echo: 3/16/2022.  Left ventricular cavity size is mildly dilated.  There is mild concentric left ventricular hypertrophy.  Pt appears to be in A fib.  Ejection fraction is mildly reduced but accurate LVEF cannot be calculated  due to A fib  Indeterminate diastolic function.  No evidence of left ventricular mass or thrombus noted.  Mitral annular calcification is present.  Calcification of the leaflets of the mitral valve.  Trivial mitral regurgitation.  The left atrium is moderately dilated.  Aortic valve leaflets appear mildly  angiography.    4) CAD. Continue with medical management and risk factor modification including statin.  Not on aspirin with DOAC use and no ACS history.    5) Atrial flutter s/p ablation.  Currently in sinus rhythm.  Continue DOAC.    6) Type 2 diabetes mellitus.  Management per PCP.    Overall, the problems requiring hospitalization are high in severity. Will sign off if no significant findings on echocardiogram.  If echo cannot be completed in a timely manner, the test could be performed as an outpatient.  Call with questions.  Patient should continue to follow-up with his primary cardiologist.    Thank you for allowing us to participate in the care of Yasir Bojorquez.    Vinay Tang MD  [facility]  5/24/2024 9:40 AM

## 2024-05-25 ENCOUNTER — APPOINTMENT (OUTPATIENT)
Age: 79
End: 2024-05-25
Attending: INTERNAL MEDICINE
Payer: MEDICARE

## 2024-05-25 VITALS
BODY MASS INDEX: 27.22 KG/M2 | TEMPERATURE: 97.8 F | RESPIRATION RATE: 16 BRPM | HEART RATE: 70 BPM | OXYGEN SATURATION: 95 % | HEIGHT: 72 IN | DIASTOLIC BLOOD PRESSURE: 74 MMHG | WEIGHT: 201 LBS | SYSTOLIC BLOOD PRESSURE: 116 MMHG

## 2024-05-25 PROBLEM — I95.1 SYNCOPE DUE TO ORTHOSTATIC HYPOTENSION: Status: ACTIVE | Noted: 2024-05-25

## 2024-05-25 LAB
ANION GAP SERPL CALCULATED.3IONS-SCNC: 14 MMOL/L (ref 3–16)
BASOPHILS # BLD: 0 K/UL (ref 0–0.2)
BASOPHILS NFR BLD: 1 %
BUN SERPL-MCNC: 9 MG/DL (ref 7–20)
CALCIUM SERPL-MCNC: 8.8 MG/DL (ref 8.3–10.6)
CHLORIDE SERPL-SCNC: 101 MMOL/L (ref 99–110)
CO2 SERPL-SCNC: 23 MMOL/L (ref 21–32)
CREAT SERPL-MCNC: 0.5 MG/DL (ref 0.8–1.3)
DEPRECATED RDW RBC AUTO: 14.4 % (ref 12.4–15.4)
ECHO AO ASC DIAM: 3.4 CM
ECHO AO ASCENDING AORTA INDEX: 1.6 CM/M2
ECHO AO ROOT DIAM: 4.2 CM
ECHO AO ROOT INDEX: 1.97 CM/M2
ECHO AV MEAN GRADIENT: 8 MMHG
ECHO AV MEAN VELOCITY: 1.4 M/S
ECHO AV PEAK GRADIENT: 15 MMHG
ECHO AV PEAK VELOCITY: 1.9 M/S
ECHO AV VELOCITY RATIO: 0.47
ECHO AV VTI: 44.4 CM
ECHO BSA: 2.15 M2
ECHO EST RA PRESSURE: 3 MMHG
ECHO IVC PROX: 1.6 CM
ECHO LA AREA 2C: 15 CM2
ECHO LA AREA 4C: 32.7 CM2
ECHO LA MAJOR AXIS: 7.6 CM
ECHO LA MINOR AXIS: 5.2 CM
ECHO LA VOL MOD A2C: 32 ML (ref 18–58)
ECHO LA VOL MOD A4C: 111 ML (ref 18–58)
ECHO LA VOLUME INDEX MOD A2C: 15 ML/M2 (ref 16–34)
ECHO LA VOLUME INDEX MOD A4C: 52 ML/M2 (ref 16–34)
ECHO LV E' LATERAL VELOCITY: 17 CM/S
ECHO LV E' SEPTAL VELOCITY: 7 CM/S
ECHO LV EDV A2C: 105 ML
ECHO LV EDV A4C: 107 ML
ECHO LV EDV INDEX A4C: 50 ML/M2
ECHO LV EDV NDEX A2C: 49 ML/M2
ECHO LV EJECTION FRACTION A2C: 67 %
ECHO LV EJECTION FRACTION A4C: 67 %
ECHO LV ESV A2C: 34 ML
ECHO LV ESV A4C: 35 ML
ECHO LV ESV INDEX A2C: 16 ML/M2
ECHO LV ESV INDEX A4C: 16 ML/M2
ECHO LV FRACTIONAL SHORTENING: 35 % (ref 28–44)
ECHO LV INTERNAL DIMENSION DIASTOLE INDEX: 2.54 CM/M2
ECHO LV INTERNAL DIMENSION DIASTOLIC: 5.4 CM (ref 4.2–5.9)
ECHO LV INTERNAL DIMENSION SYSTOLIC INDEX: 1.64 CM/M2
ECHO LV INTERNAL DIMENSION SYSTOLIC: 3.5 CM
ECHO LV IVSD: 1.5 CM (ref 0.6–1)
ECHO LV MASS 2D: 380.5 G (ref 88–224)
ECHO LV MASS INDEX 2D: 178.7 G/M2 (ref 49–115)
ECHO LV POSTERIOR WALL DIASTOLIC: 1.6 CM (ref 0.6–1)
ECHO LV RELATIVE WALL THICKNESS RATIO: 0.59
ECHO LVOT AV VTI INDEX: 0.41
ECHO LVOT MEAN GRADIENT: 2 MMHG
ECHO LVOT PEAK GRADIENT: 3 MMHG
ECHO LVOT PEAK VELOCITY: 0.9 M/S
ECHO LVOT VTI: 18 CM
ECHO MV A VELOCITY: 1.02 M/S
ECHO MV E DECELERATION TIME (DT): 253 MS
ECHO MV E VELOCITY: 0.74 M/S
ECHO MV E/A RATIO: 0.73
ECHO MV E/E' LATERAL: 4.35
ECHO MV E/E' RATIO (AVERAGED): 7.46
ECHO MV E/E' SEPTAL: 10.57
ECHO MV LVOT VTI INDEX: 1.51
ECHO MV MAX VELOCITY: 1.1 M/S
ECHO MV MEAN GRADIENT: 2 MMHG
ECHO MV MEAN VELOCITY: 0.7 M/S
ECHO MV PEAK GRADIENT: 5 MMHG
ECHO MV VTI: 27.1 CM
ECHO PV MAX VELOCITY: 0.9 M/S
ECHO PV PEAK GRADIENT: 3 MMHG
ECHO RA AREA 4C: 16.7 CM2
ECHO RA END SYSTOLIC VOLUME APICAL 4 CHAMBER INDEX BSA: 17 ML/M2
ECHO RA VOLUME: 37 ML
ECHO RIGHT VENTRICULAR SYSTOLIC PRESSURE (RVSP): 7 MMHG
ECHO RV BASAL DIMENSION: 3.7 CM
ECHO RV FREE WALL PEAK S': 17 CM/S
ECHO RV LONGITUDINAL DIMENSION: 6.7 CM
ECHO RV MID DIMENSION: 3.8 CM
ECHO RV TAPSE: 1.8 CM (ref 1.7–?)
ECHO TV REGURGITANT MAX VELOCITY: 1.06 M/S
ECHO TV REGURGITANT PEAK GRADIENT: 4 MMHG
EOSINOPHIL # BLD: 0.1 K/UL (ref 0–0.6)
EOSINOPHIL NFR BLD: 1.7 %
GFR SERPLBLD CREATININE-BSD FMLA CKD-EPI: >90 ML/MIN/{1.73_M2}
GLUCOSE BLD-MCNC: 108 MG/DL (ref 70–99)
GLUCOSE BLD-MCNC: 99 MG/DL (ref 70–99)
GLUCOSE SERPL-MCNC: 98 MG/DL (ref 70–99)
HCT VFR BLD AUTO: 38.3 % (ref 40.5–52.5)
HGB BLD-MCNC: 13.2 G/DL (ref 13.5–17.5)
LYMPHOCYTES # BLD: 0.8 K/UL (ref 1–5.1)
LYMPHOCYTES NFR BLD: 16.9 %
MCH RBC QN AUTO: 30.2 PG (ref 26–34)
MCHC RBC AUTO-ENTMCNC: 34.6 G/DL (ref 31–36)
MCV RBC AUTO: 87.2 FL (ref 80–100)
MONOCYTES # BLD: 0.6 K/UL (ref 0–1.3)
MONOCYTES NFR BLD: 14.2 %
NEUTROPHILS # BLD: 2.9 K/UL (ref 1.7–7.7)
NEUTROPHILS NFR BLD: 66.2 %
PERFORMED ON: ABNORMAL
PERFORMED ON: NORMAL
PLATELET # BLD AUTO: 164 K/UL (ref 135–450)
PMV BLD AUTO: 8.8 FL (ref 5–10.5)
POTASSIUM SERPL-SCNC: 3.6 MMOL/L (ref 3.5–5.1)
PROCALCITONIN SERPL IA-MCNC: 0.16 NG/ML (ref 0–0.15)
RBC # BLD AUTO: 4.39 M/UL (ref 4.2–5.9)
SODIUM SERPL-SCNC: 138 MMOL/L (ref 136–145)
WBC # BLD AUTO: 4.5 K/UL (ref 4–11)

## 2024-05-25 PROCEDURE — 85025 COMPLETE CBC W/AUTO DIFF WBC: CPT

## 2024-05-25 PROCEDURE — 6360000002 HC RX W HCPCS: Performed by: INTERNAL MEDICINE

## 2024-05-25 PROCEDURE — 36415 COLL VENOUS BLD VENIPUNCTURE: CPT

## 2024-05-25 PROCEDURE — 6370000000 HC RX 637 (ALT 250 FOR IP): Performed by: INTERNAL MEDICINE

## 2024-05-25 PROCEDURE — 94760 N-INVAS EAR/PLS OXIMETRY 1: CPT

## 2024-05-25 PROCEDURE — 80048 BASIC METABOLIC PNL TOTAL CA: CPT

## 2024-05-25 PROCEDURE — 99239 HOSP IP/OBS DSCHRG MGMT >30: CPT | Performed by: INTERNAL MEDICINE

## 2024-05-25 PROCEDURE — 93306 TTE W/DOPPLER COMPLETE: CPT

## 2024-05-25 PROCEDURE — 99223 1ST HOSP IP/OBS HIGH 75: CPT | Performed by: INTERNAL MEDICINE

## 2024-05-25 PROCEDURE — 2580000003 HC RX 258: Performed by: INTERNAL MEDICINE

## 2024-05-25 PROCEDURE — 84145 PROCALCITONIN (PCT): CPT

## 2024-05-25 RX ORDER — AMOXICILLIN AND CLAVULANATE POTASSIUM 875; 125 MG/1; MG/1
1 TABLET, FILM COATED ORAL 2 TIMES DAILY
Qty: 14 TABLET | Refills: 0 | Status: SHIPPED | OUTPATIENT
Start: 2024-05-25 | End: 2024-06-01

## 2024-05-25 RX ORDER — TAMSULOSIN HYDROCHLORIDE 0.4 MG/1
0.4 CAPSULE ORAL NIGHTLY
Qty: 30 CAPSULE | Refills: 3 | Status: SHIPPED | OUTPATIENT
Start: 2024-05-25

## 2024-05-25 RX ADMIN — ISOSORBIDE MONONITRATE 30 MG: 30 TABLET, EXTENDED RELEASE ORAL at 10:04

## 2024-05-25 RX ADMIN — EMPAGLIFLOZIN 10 MG: 10 TABLET, FILM COATED ORAL at 10:04

## 2024-05-25 RX ADMIN — PIPERACILLIN AND TAZOBACTAM 3375 MG: 3; .375 INJECTION, POWDER, LYOPHILIZED, FOR SOLUTION INTRAVENOUS at 06:31

## 2024-05-25 RX ADMIN — PANTOPRAZOLE SODIUM 40 MG: 40 TABLET, DELAYED RELEASE ORAL at 06:31

## 2024-05-25 RX ADMIN — PRAVASTATIN SODIUM 40 MG: 40 TABLET ORAL at 10:03

## 2024-05-25 RX ADMIN — APIXABAN 5 MG: 5 TABLET, FILM COATED ORAL at 10:03

## 2024-05-25 RX ADMIN — VERAPAMIL HYDROCHLORIDE 180 MG: 180 TABLET, FILM COATED, EXTENDED RELEASE ORAL at 10:03

## 2024-05-25 ASSESSMENT — PAIN SCALES - GENERAL: PAINLEVEL_OUTOF10: 0

## 2024-05-25 NOTE — PROGRESS NOTES
[facility] Note      Chief Complaint: I feel ok    Subjective/Interval History:    This morning the patient is alert and oriented.  He feels like he has some increased swelling inside of his mouth but otherwise no new problems.  Orthostatics last night showed a 20 point drop in systolic blood pressure with standing from the 160s to the 140s.  Blood pressure yesterday after verapamil and 10 mg of lisinopril dropped to approximately 100 systolic, but overnight has been running fairly hypertensive.  No other syncopal/presyncopal episodes noted.  He is anxious to go home if possible today.  He does report he was ambulating in the hallways without difficulty yesterday.  PT/OT notes reviewed from yesterday, the patient did well.    No chest pain or shortness breath. No cough or sputum. No nausea, vomiting, diarrhea. No abdominal pain. No dysuria.  The remainder of the review of systems is negative.     PMH, PSH, FH/SH reviewed and unchanged as documented in the H&P personally documented at admission 24    Medication list reviewed    Objective:    BP (!) 170/113   Pulse 78   Temp 98.2 °F (36.8 °C) (Oral)   Resp 18   Ht 1.829 m (6')   Wt 91.4 kg (201 lb 8 oz)   SpO2 95%   BMI 27.33 kg/m²   Temp  Av.2 °F (36.8 °C)  Min: 98 °F (36.7 °C)  Max: 98.3 °F (36.8 °C)    HEENT-oropharynx with continued inflammation and tenderness around the base of tooth #8.  No fluctuance of the gumline.  RRR.  Telemetry with sinus rhythm   Chest-respirations are easy.  The chest is clear throughout  Abd- BS+, soft, NTND.  Benign exam  Ext- no edema    The Following Labs Were Reviewed Today:     Recent Results (from the past 24 hour(s))   POCT Glucose    Collection Time: 24 12:10 PM   Result Value Ref Range    POC Glucose 107 (H) 70 - 99 mg/dl    Performed on ACCU-CHEK    POCT Glucose    Collection Time: 24  5:50 PM   Result Value Ref Range    POC Glucose 96 70 - 99 mg/dl    Performed on ACCU-CHEK    POCT  yesterday morning.  May add lisinopril at night and keep verapamil in the morning.  Unfortunately patient probably does need tamsulosin to prevent urine retention.  Await and see how patient does today after morning blood pressure medications and consider discharge to home later today  Active Problems:    Transient hypotension-continue to follow-up    Lactic acidosis-resolved.    Infection of mouth-continue IV antibiotics.  Procalcitonin better.  WBC better.      Elevated troponin-echocardiogram pending, hopefully can get this done today but will perform as an outpatient if unable to get done today    Rigors-no further rigors noted.  Blood culture still pending    Essential hypertension-blood pressure management is a bit difficult right now.  See above    Type 2 diabetes mellitus without complication, without long-term current use of insulin-sugars have been excellent here    Hyperlipidemia-continue pravastatin therapy    Coronary artery disease involving native coronary artery of native heart without angina pectoris-as noted above    Neutrophilic leukocytosis-resolved    Time > 35 minutes reviewing chart and patient data, examining and interviewing patient, and discussing with nursing staff, family, etc.         HALIE HOYOS MD, FACP  8:43 AM

## 2024-05-25 NOTE — CARE COORDINATION
Discharge order noted. Chart reviewed. Plan is to return home with family. Previously denied needs. No additional discharge needs identified at this time.    Electronically signed by Janee Courtney RN MSN on 5/25/2024 at 2:21 PM

## 2024-05-25 NOTE — FLOWSHEET NOTE
05/24/24 2030   Vital Signs   Orthostatic B/P and Pulse? Yes   Blood Pressure Lying 166/95   Pulse Lying 65 PER MINUTE   Blood Pressure Sitting 153/91   Pulse Sitting 75 PER MINUTE   Blood Pressure Standing 147/85   Pulse Standing 71 PER MINUTE

## 2024-05-25 NOTE — PLAN OF CARE
Problem: Discharge Planning  Goal: Discharge to home or other facility with appropriate resources  5/25/2024 1126 by Cecilia Kirkpatrick RN  Outcome: Progressing  5/25/2024 0211 by Rody Martinez RN  Outcome: Progressing  Flowsheets (Taken 5/24/2024 2030)  Discharge to home or other facility with appropriate resources:   Identify barriers to discharge with patient and caregiver   Identify discharge learning needs (meds, wound care, etc)     Problem: Safety - Adult  Goal: Free from fall injury  5/25/2024 1126 by Ceiclia Kirkpatrick RN  Outcome: Progressing  5/25/2024 0211 by Rody Martinez RN  Outcome: Progressing  Flowsheets (Taken 5/25/2024 0211)  Free From Fall Injury: Instruct family/caregiver on patient safety

## 2024-05-25 NOTE — PROGRESS NOTES
Pt A&Ox 4 on RA. AM assessment and vitals completed and put into flowsheets. AM medications given with no s/s of aspiration. Pt with no questions or concerns voiced to RN at this time. Fall precautions in place and call light within reach.     Orthostatic Vitals:      5/25/2024     8:19 AM   Orthostatic Vitals   Orthostatic B/P and Pulse? Yes   Blood Pressure Lying 170/113   Pulse Lying 78 PER MINUTE   Blood Pressure Sitting 152/101   Pulse Sitting 80 PER MINUTE   Blood Pressure Standing 137/89   Pulse Standing 90 PER MINUTE

## 2024-05-25 NOTE — PLAN OF CARE
Problem: Discharge Planning  Goal: Discharge to home or other facility with appropriate resources  5/25/2024 0211 by Rody Martinez, RN  Outcome: Progressing  Flowsheets (Taken 5/24/2024 2030)  Discharge to home or other facility with appropriate resources:   Identify barriers to discharge with patient and caregiver   Identify discharge learning needs (meds, wound care, etc)  5/24/2024 1748 by Sunday Darby, RN  Outcome: Progressing     Problem: Safety - Adult  Goal: Free from fall injury  5/25/2024 0211 by Rody Martinez, RN  Outcome: Progressing  Flowsheets (Taken 5/25/2024 0211)  Free From Fall Injury: Instruct family/caregiver on patient safety  5/24/2024 1748 by Sunday Darby, RN  Outcome: Progressing

## 2024-05-25 NOTE — PROGRESS NOTES
Pt with active discharge order. RN went over discharge instructions with patient, pt and pt's family states understanding. IV and telemetry removed. Pt with no questions or concerns voiced to RN at this time.

## 2024-05-25 NOTE — PROGRESS NOTES
Patient doing well enough to go home.  Ambulating about the room.  No new problems noted.  Significant supine hypertension, but remains orthostatic with standing, possibly due to autonomic neuropathy.  Tolerating current blood pressure regimen.  Cardiology note reviewed, echo joe diaz for discharge.  Electronically signed by HALIE HOYOS MD on 5/25/2024 at 2:01 PM

## 2024-05-25 NOTE — PROGRESS NOTES
Cardiac Electrophysiology Progress Note     Admit Date: 2024     Reason for follow up: syncope    HPI and Interval History:   Patient seen and examined. Clinical notes reviewed.   Telemetry reviewed. No new complaint today.   No major events overnight.   Denies having angina, shortness of breath, dyspnea on exertion, Orthopnea, PND at the time of this visit.    Review of System:  Pertinent negatives and positives are mentioned in the HPI and interval history above. The rest are negative.       Physical Examination:  Vitals:    24 1214   BP: 116/74   Pulse: 70   Resp: 16   Temp: 97.8 °F (36.6 °C)   SpO2: 96%        Intake/Output Summary (Last 24 hours) at 2024 1249  Last data filed at 2024 0930  Gross per 24 hour   Intake 180 ml   Output --   Net 180 ml     In: 180 [P.O.:180]  Out: -    Wt Readings from Last 3 Encounters:   24 91.2 kg (201 lb)   24 92.9 kg (204 lb 12.8 oz)   24 93.6 kg (206 lb 6.4 oz)     Temp  Av.1 °F (36.7 °C)  Min: 97.8 °F (36.6 °C)  Max: 98.3 °F (36.8 °C)  Pulse  Av.6  Min: 58  Max: 78  BP  Min: 101/62  Max: 188/94  SpO2  Av %  Min: 92 %  Max: 96 %    Telemetry: Sinus rhythm   Constitutional: Alert. Oriented to person, place, and time. No distress.   HEENT: Normocephalic atraumatic. PEERLA, mucosa moist.   Neck: supple.   Cardiovascular: Normal rate, regular rhythm. Normal S1&S2.     Pulmonary/Chest: Bilateral respiratory sounds present. No respiratory accessory muscle use.  No wheezes, No rhonchi. No rales.  Abdominal: Soft. Normal bowel sounds present.   Musculoskeletal: No tenderness. No pitting edema.  Neurological: Alert and oriented. Grossly intact with no obvious focal neurological deficit.  Skin: Skin is warm and dry.   Psychiatric: No anxiety nor agitation.      Labs, diagnostic and imaging results reviewed.   Reviewed.   Recent Labs     24  1106 24  0454 24  0440    137 138   K 3.8 3.7 3.6    102 101

## 2024-05-27 LAB
BACTERIA BLD CULT ORG #2: NORMAL
BACTERIA BLD CULT: NORMAL

## 2024-05-28 ENCOUNTER — CARE COORDINATION (OUTPATIENT)
Dept: CARE COORDINATION | Age: 79
End: 2024-05-28

## 2024-05-28 DIAGNOSIS — K12.2 INFECTION OF MOUTH: Primary | ICD-10-CM

## 2024-05-28 PROCEDURE — 1111F DSCHRG MED/CURRENT MED MERGE: CPT | Performed by: INTERNAL MEDICINE

## 2024-05-28 NOTE — CARE COORDINATION
Care Transitions Note    Initial Call - Call within 2 business days of discharge: Yes     Patient Current Location:  Home: [address] [place] [id number]    Care Transition Nurse contacted the patient by telephone to perform post hospital discharge assessment, verified name and  as identifiers. Provided introduction to self, and explanation of the Care Transition Nurse role.     Patient: Yasir Bojorquez    Patient : 1945   MRN: [id number]    Reason for Admission: Near syncope, dental infection orthostatic hypotension  Discharge Date: 24  RURS: Readmission Risk Score: 9.8      Last Discharge Facility       Date Complaint Diagnosis Description Type Department Provider    24 Loss of Consciousness Near syncope ... ED to Hosp-Admission (Discharged) (ADMITTED) [facility]W Sunday Jarquin MD; Luis Fernando...            Was this an external facility discharge? No    Additional needs identified to be addressed with provider   No needs identified             Method of communication with provider: none.    Patients top risk factors for readmission:  n/a    Interventions to address risk factors:   N/a    Care Summary Note: Spoke with pt who states he is doing well post discharge. Denies any fever, chills, cp or sob. States he has no pain to mouth and is able to eat and drink without difficulty. Pt states he is getting around without dizziness or lightheadedness. States he has a bp cuff at home and has check BP - no values given but pt states BP has been good. Pt has discontinued his lisinopril per orders. Pt to see his PCP  and oral surgeon 6/3. Pt is ok with calls but [place]  and would like calls to stop prior to then. Will continue to follow.     Care Transition Nurse reviewed discharge instructions with patient. The patient was given an opportunity to ask questions; all questions answered at this time.. The patient verbalized understanding.   Were discharge

## 2024-06-04 ENCOUNTER — CARE COORDINATION (OUTPATIENT)
Dept: CASE MANAGEMENT | Age: 79
End: 2024-06-04

## 2024-06-04 NOTE — CARE COORDINATION
Care Transitions Note    Follow Up Call      Patient Current Location:  Home: [address] [place], [place] [id number]    [facility] Care Coordinator contacted the patient by telephone. Verified name and  as identifiers.    Additional needs identified to be addressed with provider   Standard priority: Patient c/o that he is having some increased abdomen upset/pain, and alternating loose stool and constipation. Patient stated that the abdomen pain/upset has been going on for awhile but here lately it seems to be worse. Patient stated that he completed his ATB Tx 4 days ago which he feels should be long enough for the side effects of the ATB to have resolve. Patient also reported that it is effecting his food intake to the point the eating makes the pain worse. Patient concerned Please advise                 Method of communication with provider: chart routing.    Care Summary Note: Spoke with Yasir Bojorquez who reported that he is doing good except for the increased abdominal pain described in the yellow box. Patient denied any further episodes of syncope, dizziness or lightheadedness. Patient stated that he is checking BP and stated it is mostly in the mid to upper 120/80. Patient stated that he sees his dentist this afternoon to start removal of his once infected tooth. Patient denied cp, sob, cough, headache, n/v, fever, or chills. Patient report that appetite fair and fluid intake is good and denied any problems with bladder. Patient reported that he is taking all medications as ordered. Patient denied any other needs at this time. Patient instructed to continue to monitor s/s, reporting any that may present to MD immediately for early intervention.  Patient is agreeable to f/u calls.     Plan of care updates since last contact:  C/o abdominal pain       Advance Care Planning   The patient has the following advanced directives on file:  Advance Directives       Power of  Living Will ACP-Advance Directive

## 2024-06-06 ENCOUNTER — CARE COORDINATION (OUTPATIENT)
Dept: CASE MANAGEMENT | Age: 79
End: 2024-06-06

## 2024-06-06 NOTE — CARE COORDINATION
HFU: pt has a HFU today with PCP. Will call patient at a later time    KYLIE Wilkes, RN   Care Transition Nurse  Mobile: [phone]

## 2024-06-07 ENCOUNTER — CARE COORDINATION (OUTPATIENT)
Dept: CASE MANAGEMENT | Age: 79
End: 2024-06-07

## 2024-06-07 NOTE — CARE COORDINATION
Care Transitions Note  Follow Up Call     Attempted to reach patient for transitions of care follow up.  Unable to reach patient.      Outreach Attempts:   HIPAA compliant voicemail left for patient.     Care Summary Note:   Attempted to contact patient for follow up transition call. Left voicemail message to return call with an update on condition since discharge. Contact information provided. Will continue to follow up.    Care Transitions Note  Follow Up Call     Patient Current Location:  Home: [address] [place] [address] Care Coordinator contacted the patient by telephone. Verified name and  as identifiers.    Additional needs identified to be addressed with provider   No needs identified                 Method of communication with provider: none.    Care Summary Note:   Patient returned call. He stated he is doing good. Denies sob, fever, chills, tremors, syncope, lh, dizziness, cp or palpitations. Has abdominal pain from time to time. He has had this for years, it is nothing unusual. Good appetite and fluid intake. No urinary or bowel elimination problems. Had f/u yesterday. No new medications. /78. He  couldn't remember his BS. No questions, needs or concerns at this time.     Plan of care updates since last contact:        Advance Care Planning:   Does patient have an Advance Directive: reviewed and current.    Medication Review:  No changes since last call.     Remote Patient Monitoring:  Offered patient enrollment in the Remote Patient Monitoring (RPM) program for in-home monitoring: Yes, but did not enroll at this time: already monitoring with home equipment.    Assessments:  Care Transitions Subsequent and Final Call    Subsequent and Final Calls  Do you have any ongoing symptoms?: No  Have your medications changed?: No  Do you have any questions related to your medications?: No  Do you currently have any active services?: No  Do you have any needs or concerns that I can

## 2024-06-13 ENCOUNTER — CARE COORDINATION (OUTPATIENT)
Dept: CASE MANAGEMENT | Age: 79
End: 2024-06-13

## 2024-06-13 NOTE — CARE COORDINATION
Care Transitions Note  Follow Up Call     Patient Current Location:  Home: [address] [place] [id number]    Care Transition Nurse contacted the patient by telephone. Verified name and  as identifiers.    Additional needs identified to be addressed with provider   No needs identified                 Method of communication with provider: none.    Care Summary Note: Spoke with pt who states he had 5 teeth extracted today and has a partial plate. State he is in quite a bit of pain but hydrocodone is helping. States he is going to be on a liquid diet and progress as he feels better. Denies dizziness or lightheadedness. Managing BP at home - 125-130 systolic. Pt has remained off lisinopril. We discussed if Bp rises he can call PCP to discuss the need to add meds back. Pt will start back on blood thinner tomorrow. Pt is continuing to take amoxicillin as prescribed. We discussed if fever were to occur to call surgeon. Discussed antibiotics can cause diarrhea while narcotics can cause constipation. Denies questions or concerns.     Plan of care updates since last contact:  Education: as above       Advance Care Planning:   Does patient have an Advance Directive: reviewed during previous call, see note. .    Medication Review:  Medications changed since last call, reviewed today.     Remote Patient Monitoring:  Offered patient enrollment in the Remote Patient Monitoring (RPM) program for in-home monitoring: Yes, but did not enroll at this time: . .    Assessments:  No changes since last call    Follow Up Appointment:   [facility] appointment attended as scheduled   Future Appointments         Provider Specialty Dept Phone    9/3/2024 11:00 AM Sunday Jarquin MD Internal Medicine [phone]            Care Transition Nurse provided contact information.  Plan for follow-up call in 6-10 days based on severity of symptoms and risk factors.  Plan for next call: symptom management-.      Mansi Fuentes, THAON, RN

## 2024-06-19 ENCOUNTER — CARE COORDINATION (OUTPATIENT)
Dept: CASE MANAGEMENT | Age: 79
End: 2024-06-19

## 2024-06-19 NOTE — CARE COORDINATION
Care Transitions Note    Follow Up Call     Patient Current Location:  Home: [address] [place] [id number]    Care Transition Nurse contacted the patient by telephone. Verified name and  as identifiers.    Additional needs identified to be addressed with provider   No needs identified                 Method of communication with provider: none.    Care Summary Note: Spoke with pt who states he is doing well. States his partial plate broke so he will be returning to the dentist tomorrow to get his new one. States mouth is still sore but he is able to eat and drink without issues. Finished up his abx today. Denies diarrhea or constipation. Still watching BP states yesterday it was around 120 systolic. We discussed this is a good number as long as he is not symptomatic as he struggled with orthostatic hypotension before. Discussed speaking with PCP if he has any symptoms or this becomes lower. Denies questions or concerns.     Care Transition Nurse reviewed discharge instructions with patient. The patient was given an opportunity to ask questions; all questions answered at this time.. The patient verbalized understanding.   Were discharge instructions available to patient? Yes.   Reviewed appropriate site of care based on symptoms and resources available to patient including: PCP  Specialist. The patient agrees to contact the primary care provider and/or specialist office for questions related to their healthcare.      Advance Care Planning:   Does patient have an Advance Directive: reviewed during previous call, see note. .    Medication Review:  No changes since last call.     Remote Patient Monitoring:  Offered patient enrollment in the Remote Patient Monitoring (RPM) program for in-home monitoring: Yes, but did not enroll at this time: already monitoring with home equipment.    Assessments:  No changes since last call    Follow Up Appointment:   Reviewed upcoming appointment(s).  Future Appointments

## 2024-06-22 PROBLEM — R79.89 ELEVATED TROPONIN: Status: RESOLVED | Noted: 2024-05-23 | Resolved: 2024-06-22

## 2024-06-26 ENCOUNTER — CARE COORDINATION (OUTPATIENT)
Dept: CASE MANAGEMENT | Age: 79
End: 2024-06-26

## 2024-06-26 NOTE — CARE COORDINATION
Care Transitions Note    Final Call     Patient Current Location:  Home: [address] [place] [id number]    Care Transition Nurse contacted the patient by telephone. Verified name and  as identifiers.    Patient graduated from the Care Transitions program on .  Patient/family verbalizes confidence in the ability to self-manage at this time..      Advance Care Planning:   Does patient have an Advance Directive: reviewed during previous call, see note. .    Handoff:   Patient/family agreeable to [facility] outreach.      Care Summary Note: Spoke with pt who states he is \"back and forth.\" One day he will feel better, the next day he may not. States he is still experiencing dizziness at time. Reassures writer he does not feel he cannot walk around with these spells or as if he would fall. States he has no nausea or vomiting but states he does have abd pain at times. Feels some of these symptoms may be stemming from his medications - oxempic and farsixa. States he will be making an appt with PCP to discuss. Pt states when dizzy - he does check BP and it is still around 120/70. Denies any other issues. Agreeable to hand off to [facility]    Assessments:  No changes since last call    Upcoming Appointments:    Future Appointments         Provider Specialty Dept Phone    9/3/2024 11:00 AM Sunday Jarquin MD Internal Medicine [phone]            Patient has agreed to contact primary care provider and/or specialist for any further questions, concerns, or needs.    EMMA Fuentes RN

## 2024-06-27 ENCOUNTER — CARE COORDINATION (OUTPATIENT)
Dept: CARE COORDINATION | Age: 79
End: 2024-06-27

## 2024-07-01 ENCOUNTER — CARE COORDINATION (OUTPATIENT)
Dept: CARE COORDINATION | Age: 79
End: 2024-07-01

## 2024-07-01 NOTE — CARE COORDINATION
Ambulatory Care Coordination Note     7/1/2024 11:35 AM     Patient outreach attempt by this ACM today to perform care management follow up . ACM was unable to reach the patient by telephone today;  phone rang and no one picked up.     ACM: Latisha Ross, RN     Care Summary Note: CTN hand off, initial phone call unable to complete due to pt not answering phone.    PCP/Specialist follow up:   Future Appointments         Provider Specialty Dept Phone    9/3/2024 11:00 AM Sunday Jarquin MD Internal Medicine [phone]            Follow Up:   Plan for next ACM outreach in approximately 1 week to complete:  - CC Protocol assessments  - disease specific assessments  - SDOH assessments  - medication review  - advance care planning  - goal progression  - education   - RPM.

## 2024-07-11 ENCOUNTER — CARE COORDINATION (OUTPATIENT)
Dept: CARE COORDINATION | Age: 79
End: 2024-07-11

## 2024-07-11 SDOH — ECONOMIC STABILITY: TRANSPORTATION INSECURITY
IN THE PAST 12 MONTHS, HAS THE LACK OF TRANSPORTATION KEPT YOU FROM MEDICAL APPOINTMENTS OR FROM GETTING MEDICATIONS?: NO

## 2024-07-11 SDOH — ECONOMIC STABILITY: INCOME INSECURITY: IN THE LAST 12 MONTHS, WAS THERE A TIME WHEN YOU WERE NOT ABLE TO PAY THE MORTGAGE OR RENT ON TIME?: NO

## 2024-07-11 SDOH — ECONOMIC STABILITY: FOOD INSECURITY: WITHIN THE PAST 12 MONTHS, YOU WORRIED THAT YOUR FOOD WOULD RUN OUT BEFORE YOU GOT MONEY TO BUY MORE.: NEVER TRUE

## 2024-07-11 SDOH — ECONOMIC STABILITY: FOOD INSECURITY: WITHIN THE PAST 12 MONTHS, THE FOOD YOU BOUGHT JUST DIDN'T LAST AND YOU DIDN'T HAVE MONEY TO GET MORE.: NEVER TRUE

## 2024-07-11 SDOH — ECONOMIC STABILITY: HOUSING INSECURITY
IN THE LAST 12 MONTHS, WAS THERE A TIME WHEN YOU DID NOT HAVE A STEADY PLACE TO SLEEP OR SLEPT IN A SHELTER (INCLUDING NOW)?: NO

## 2024-07-11 SDOH — ECONOMIC STABILITY: INCOME INSECURITY: HOW HARD IS IT FOR YOU TO PAY FOR THE VERY BASICS LIKE FOOD, HOUSING, MEDICAL CARE, AND HEATING?: NOT HARD AT ALL

## 2024-07-11 SDOH — HEALTH STABILITY: PHYSICAL HEALTH: ON AVERAGE, HOW MANY DAYS PER WEEK DO YOU ENGAGE IN MODERATE TO STRENUOUS EXERCISE (LIKE A BRISK WALK)?: 1 DAY

## 2024-07-11 SDOH — ECONOMIC STABILITY: HOUSING INSECURITY: IN THE LAST 12 MONTHS, HOW MANY PLACES HAVE YOU LIVED?: 1

## 2024-07-11 SDOH — ECONOMIC STABILITY: TRANSPORTATION INSECURITY
IN THE PAST 12 MONTHS, HAS LACK OF TRANSPORTATION KEPT YOU FROM MEETINGS, WORK, OR FROM GETTING THINGS NEEDED FOR DAILY LIVING?: NO

## 2024-07-11 SDOH — HEALTH STABILITY: PHYSICAL HEALTH: ON AVERAGE, HOW MANY MINUTES DO YOU ENGAGE IN EXERCISE AT THIS LEVEL?: 80 MIN

## 2024-07-11 ASSESSMENT — SOCIAL DETERMINANTS OF HEALTH (SDOH)
HOW OFTEN DO YOU ATTENT MEETINGS OF THE CLUB OR ORGANIZATION YOU BELONG TO?: MORE THAN 4 TIMES PER YEAR
IN A TYPICAL WEEK, HOW MANY TIMES DO YOU TALK ON THE PHONE WITH FAMILY, FRIENDS, OR NEIGHBORS?: MORE THAN THREE TIMES A WEEK
HOW OFTEN DO YOU GET TOGETHER WITH FRIENDS OR RELATIVES?: MORE THAN THREE TIMES A WEEK
HOW OFTEN DO YOU ATTEND CHURCH OR RELIGIOUS SERVICES?: MORE THAN 4 TIMES PER YEAR
DO YOU BELONG TO ANY CLUBS OR ORGANIZATIONS SUCH AS CHURCH GROUPS UNIONS, FRATERNAL OR ATHLETIC GROUPS, OR SCHOOL GROUPS?: YES

## 2024-07-11 ASSESSMENT — LIFESTYLE VARIABLES
HOW OFTEN DO YOU HAVE A DRINK CONTAINING ALCOHOL: 4 OR MORE TIMES A WEEK
HOW MANY STANDARD DRINKS CONTAINING ALCOHOL DO YOU HAVE ON A TYPICAL DAY: 1 OR 2

## 2024-07-11 NOTE — CARE COORDINATION
problems   How do daily activities impact on the patient's well-being? (include current or anticipated unemployment, work, caregiving, access to transportation or other): No identified problems or perceived positive benefits   How would you rate their social network (family, work, friends)?: Good participation with social networks   How would you rate their financial resources (including ability to afford all required medical care)?: Financially secure, resources adequate, no identified problems   How wells does the patient now understand their health and well-being (symptoms, signs or risk factors) and what they need to do to manage their health?: Reasonable to good understanding and already engages in managing health or is willing to undertake better management   How well do you think your patient can engage in healthcare discussions? (Barriers include language, deafness, aphasia, alcohol or drug problems, learning difficulties, concentration): Clear and open communication, no identified barriers   Do other services need to be involved to help this patient?: Other care/services not required at this time   Are current services involved with this patient well-coordinated? (Include coordination with other services you are now recommendation): All required care/services in place and well-coordinated   Suggested Interventions and Community Resources   Other Services or Interventions: HTN Education sent via MyChart   Zone Management Tools: In Process               , and   Hypertension - Encounter Level              Medications Reviewed:   Not completed during this call: Pt did not have med list in front of him.  Will review at next outreach.    Advance Care Planning:   Not reviewed during this call     Care Planning:   Education Documentation  No documentation found.  Education Comments  No comments found.     ,    Goals Addressed                   This Visit's Progress     Medication Management        I will take my

## 2024-07-25 ENCOUNTER — CARE COORDINATION (OUTPATIENT)
Dept: CARE COORDINATION | Age: 79
End: 2024-07-25

## 2024-07-25 NOTE — CARE COORDINATION
Ambulatory Care Coordination Note     2024 1:40 PM     Patient Current Location:  Home: [address] [place] [id number]     ACM contacted the patient by telephone. Verified name and  with patient as identifiers.         ACM: Latisha Ross RN     Challenges to be reviewed by the provider   Additional needs identified to be addressed with provider No  none               Method of communication with provider: none.    Care Summary Note: CC f/u completed.  Pt was pleasant and engaged with no s/s of disease exacerbation.  Pt taking medications as prescribed.   Pt has received education and has no questions currently.  Pt has no PRN ACM needs at this time.     Offered patient enrollment in the Remote Patient Monitoring (RPM) program for in-home monitoring: Yes, but did not enroll at this time: controlled chronic disease management.     Assessments Completed:   Hypertension - Encounter Level              Medications Reviewed:   Patient denies any changes with medications and reports taking all medications as prescribed.    Advance Care Planning:   Not reviewed during this call     Care Planning:   Education Documentation  No documentation found.  Education Comments  No comments found.     ,    Goals Addressed                   This Visit's Progress     Medication Management   On track     I will take my medication as directed.  I will notify my provider of any problems with medications, like adverse effects or side effects.  I will notify my provider/Care Coordinator if I am unable to afford my medications.  I will notify my provider for advice before I stop taking any of my medication.    Barriers: lack of education  Plan for overcoming my barriers: support and resources  Confidence: 8/10  Anticipated Goal Completion Date: 10/11/2024               PCP/Specialist follow up:   Future Appointments         Provider Specialty Dept Phone    9/3/2024 11:00 AM Sunday Jarquin MD Internal Medicine

## 2024-08-15 ENCOUNTER — CARE COORDINATION (OUTPATIENT)
Dept: CARE COORDINATION | Age: 79
End: 2024-08-15

## 2024-08-15 NOTE — CARE COORDINATION
Ambulatory Care Coordination Note     8/15/2024 1:13 PM     Patient outreach attempt by this ACM today to perform care management follow up . ACM was unable to reach the patient by telephone today; left voice message requesting a return phone call to this ACM.     ACM: Latisha Ross RN         PCP/Specialist follow up:   Future Appointments         Provider Specialty Dept Phone    9/3/2024 11:00 AM Sunday Jarquin MD Internal Medicine [phone]            Follow Up:   Plan for next ACM outreach in approximately 3 weeks to complete:  - advance care planning  - goal progression  - education .

## 2024-09-09 ENCOUNTER — CARE COORDINATION (OUTPATIENT)
Dept: CARE COORDINATION | Age: 79
End: 2024-09-09

## 2024-09-26 ENCOUNTER — CARE COORDINATION (OUTPATIENT)
Dept: CARE COORDINATION | Age: 79
End: 2024-09-26